# Patient Record
Sex: FEMALE | Race: WHITE | URBAN - METROPOLITAN AREA
[De-identification: names, ages, dates, MRNs, and addresses within clinical notes are randomized per-mention and may not be internally consistent; named-entity substitution may affect disease eponyms.]

---

## 2017-01-13 ENCOUNTER — OFFICE VISIT (OUTPATIENT)
Dept: FAMILY MEDICINE CLINIC | Age: 37
End: 2017-01-13

## 2017-01-13 VITALS
SYSTOLIC BLOOD PRESSURE: 102 MMHG | BODY MASS INDEX: 32.39 KG/M2 | DIASTOLIC BLOOD PRESSURE: 70 MMHG | WEIGHT: 176 LBS | HEART RATE: 78 BPM | TEMPERATURE: 98 F | RESPIRATION RATE: 16 BRPM | HEIGHT: 62 IN | OXYGEN SATURATION: 98 %

## 2017-01-13 DIAGNOSIS — J01.10 ACUTE NON-RECURRENT FRONTAL SINUSITIS: Primary | ICD-10-CM

## 2017-01-13 RX ORDER — HYDROCODONE POLISTIREX AND CHLORPHENIRAMINE POLISTIREX 10; 8 MG/5ML; MG/5ML
5 SUSPENSION, EXTENDED RELEASE ORAL
Qty: 120 ML | Refills: 0 | Status: SHIPPED | OUTPATIENT
Start: 2017-01-13 | End: 2017-01-23

## 2017-01-13 RX ORDER — AMOXICILLIN 875 MG/1
875 TABLET, FILM COATED ORAL 2 TIMES DAILY
Qty: 20 TAB | Refills: 0 | Status: SHIPPED | OUTPATIENT
Start: 2017-01-13 | End: 2017-01-23

## 2017-01-13 RX ORDER — BENZONATATE 200 MG/1
200 CAPSULE ORAL
Qty: 30 CAP | Refills: 0 | Status: SHIPPED | OUTPATIENT
Start: 2017-01-13 | End: 2017-01-20

## 2017-01-13 NOTE — MR AVS SNAPSHOT
Visit Information Date & Time Provider Department Dept. Phone Encounter #  
 1/13/2017  2:20 PM Nik Flores [phone] [id number] Follow-up Instructions Return if symptoms worsen or fail to improve. Upcoming Health Maintenance Date Due  
 PAP AKA CERVICAL CYTOLOGY 3/26/2018 DTaP/Tdap/Td series (2 - Td) 9/21/2024 Allergies as of 1/13/2017  Review Complete On: 1/13/2017 By: Anitra Em, DO No Known Allergies Current Immunizations  Never Reviewed Name Date Influenza Vaccine 10/29/2016 Tdap 9/21/2014 Not reviewed this visit You Were Diagnosed With   
  
 Codes Comments Acute non-recurrent frontal sinusitis    -  Primary ICD-10-CM: J01.10 ICD-9-CM: 403.4 Vitals BP Pulse Temp Resp Height(growth percentile) Weight(growth percentile) 102/70 (BP 1 Location: Right arm, BP Patient Position: Sitting) 78 98 °F (36.7 °C) (Oral) 16 5' 2\" (1.575 m) 176 lb (79.8 kg) SpO2 BMI OB Status Smoking Status 98% 32.19 kg/m2 Having regular periods Never Smoker Vitals History BMI and BSA Data Body Mass Index Body Surface Area  
 32.19 kg/m 2 1.87 m 2 Preferred Pharmacy Pharmacy Name Phone [facility], [address] [place] [address] [phone] Your Updated Medication List  
  
   
This list is accurate as of: 1/13/17  2:33 PM.  Always use your most recent med list.  
  
  
  
  
 albuterol 90 mcg/actuation inhaler Commonly known as:  PROVENTIL HFA, VENTOLIN HFA, PROAIR HFA Take 2 Puffs by inhalation every four (4) hours as needed for Wheezing or Shortness of Breath. amoxicillin 875 mg tablet Commonly known as:  AMOXIL Take 1 Tab by mouth two (2) times a day for 10 days. benzonatate 200 mg capsule Commonly known as:  TESSALON Take 1 Cap by mouth three (3) times daily as needed for Cough for up to 7 days. chlorpheniramine-HYDROcodone 10-8 mg/5 mL suspension Commonly known as:  Doneta Apple ER Take 5 mL by mouth every twelve (12) hours as needed for Cough for up to 10 days. Max Daily Amount: 10 mL.  
  
 escitalopram oxalate 10 mg tablet Commonly known as:  Valadez Bias TAKE ONE TABLET BY MOUTH ONE TIME DAILY  
  
 fexofenadine 180 mg tablet Commonly known as:  Kristin Record Take  by mouth. valACYclovir 500 mg tablet Commonly known as:  VALTREX Take 1 Tab by mouth two (2) times a day. Prescriptions Printed Refills  
 chlorpheniramine-HYDROcodone (TUSSIONEX PENNKINETIC ER) 10-8 mg/5 mL suspension 0 Sig: Take 5 mL by mouth every twelve (12) hours as needed for Cough for up to 10 days. Max Daily Amount: 10 mL. Class: Print Route: Oral  
  
Prescriptions Sent to Pharmacy Refills  
 amoxicillin (AMOXIL) 875 mg tablet 0 Sig: Take 1 Tab by mouth two (2) times a day for 10 days. Class: Normal  
 Pharmacy: [address] #: [phone] Route: Oral  
 benzonatate (TESSALON) 200 mg capsule 0 Sig: Take 1 Cap by mouth three (3) times daily as needed for Cough for up to 7 days. Class: Normal  
 Pharmacy: [address] #: [phone] Route: Oral  
  
Follow-up Instructions Return if symptoms worsen or fail to improve. Patient Instructions Sinusitis: Care Instructions Your Care Instructions Sinusitis is an infection of the lining of the sinus cavities in your head. Sinusitis often follows a cold. It causes pain and pressure in your head and face. In most cases, sinusitis gets better on its own in 1 to 2 weeks. But some mild symptoms may last for several weeks. Sometimes antibiotics are needed. Follow-up care is a key part of your treatment and safety.  Be sure to make and go to all appointments, and call your doctor if you are having problems. It's also a good idea to know your test results and keep a list of the medicines you take. How can you care for yourself at home? · Take an over-the-counter pain medicine, such as acetaminophen (Tylenol), ibuprofen (Advil, Motrin), or naproxen (Aleve). Read and follow all instructions on the label. · If the doctor prescribed antibiotics, take them as directed. Do not stop taking them just because you feel better. You need to take the full course of antibiotics. · Be careful when taking over-the-counter cold or flu medicines and Tylenol at the same time. Many of these medicines have acetaminophen, which is Tylenol. Read the labels to make sure that you are not taking more than the recommended dose. Too much acetaminophen (Tylenol) can be harmful. · Breathe warm, moist air from a steamy shower, a hot bath, or a sink filled with hot water. Avoid cold, dry air. Using a humidifier in your home may help. Follow the directions for cleaning the machine. · Use saline (saltwater) nasal washes to help keep your nasal passages open and wash out mucus and bacteria. You can buy saline nose drops at a grocery store or drugstore. Or you can make your own at home by adding 1 teaspoon of salt and 1 teaspoon of baking soda to 2 cups of distilled water. If you make your own, fill a bulb syringe with the solution, insert the tip into your nostril, and squeeze gently. [employer] Saucer your nose. · Put a hot, wet towel or a warm gel pack on your face 3 or 4 times a day for 5 to 10 minutes each time. · Try a decongestant nasal spray like oxymetazoline (Afrin). Do not use it for more than 3 days in a row. Using it for more than 3 days can make your congestion worse. When should you call for help? Call your doctor now or seek immediate medical care if: 
· You have new or worse swelling or redness in your face or around your eyes. · You have a new or higher fever.  
Watch closely for changes in your health, and be sure to contact your doctor if: 
· You have new or worse facial pain. · The mucus from your nose becomes thicker (like pus) or has new blood in it. · You are not getting better as expected. Where can you learn more? Go to http://lynn-palma.info/. Enter N706 in the search box to learn more about \"Sinusitis: Care Instructions. \" Current as of: July 29, 2016 Content Version: 11.1 © 5147-5296 Drive.SG. Care instructions adapted under license by [employer] (which disclaims liability or warranty for this information). If you have questions about a medical condition or this instruction, always ask your healthcare professional. [address] any warranty or liability for your use of this information. Introducing [facility]! Payton Khan introduces M-SIX patient portal. Now you can access parts of your medical record, email your doctor's office, and request medication refills online. 1. In your internet browser, go to [email or web address] 2. Click on the First Time User? Click Here link in the Sign In box. You will see the New Member Sign Up page. 3. Enter your M-SIX Access Code exactly as it appears below. You will not need to use this code after youve completed the sign-up process. If you do not sign up before the expiration date, you must request a new code. · M-SIX Access Code: [email or web address] Expires: 3/7/2017 10:03 AM 
 
4. Enter the last four digits of your Social Security Number (xxxx) and Date of Birth (mm/dd/yyyy) as indicated and click Submit. You will be taken to the next sign-up page. 5. Create a Movistat ID. This will be your M-SIX login ID and cannot be changed, so think of one that is secure and easy to remember. 6. Create a M-SIX password. You can change your password at any time. 7. Enter your Password Reset Question and Answer. This can be used at a later time if you forget your password. 8. Enter your e-mail address. You will receive e-mail notification when new information is available in [address] 9. Click Sign Up. You can now view and download portions of your medical record. 10. Click the Download Summary menu link to download a portable copy of your medical information. If you have questions, please visit the Frequently Asked Questions section of the PayPay website. Remember, PayPay is NOT to be used for urgent needs. For medical emergencies, dial 911. Now available from your iPhone and Android! Please provide this summary of care documentation to your next provider. Your primary care clinician is listed as Nilton Meeks. If you have any questions after today's visit, please call [phone].

## 2017-01-13 NOTE — PROGRESS NOTES
HISTORY OF PRESENT ILLNESS    Erin Jaquez is a 39y.o. year old female comes in today to be evaluated and treated for: cough, sore throat, congestion    Patients symptoms have been present for 1 weeks. Pain level 1/10 throat/face. Patient has tried:  OTC meds w/ some benefit. It is described as cough prod yellow and throat irritation w/ pain/pressure over right eye. No SOB or wheeze. Popping in ears but no pain. Current Outpatient Prescriptions   Medication Sig Dispense Refill    escitalopram oxalate (LEXAPRO) 10 mg tablet TAKE ONE TABLET BY MOUTH ONE TIME DAILY 90 Tab 0    fexofenadine (ALLEGRA) 180 mg tablet Take  by mouth.  albuterol (PROVENTIL HFA, VENTOLIN HFA, PROAIR HFA) 90 mcg/actuation inhaler Take 2 Puffs by inhalation every four (4) hours as needed for Wheezing or Shortness of Breath. 1 Inhaler 0    valACYclovir (VALTREX) 500 mg tablet Take 1 Tab by mouth two (2) times a day. 14 Tab 2     Past Medical History   Diagnosis Date    Depression     Seasonal allergic rhinitis     Thyroid disease        ROS:  No fever    Objective:  Visit Vitals    /70 (BP 1 Location: Right arm, BP Patient Position: Sitting)    Pulse 78    Temp 98 °F (36.7 °C) (Oral)    Resp 16    Ht 5' 2\" (1.575 m)    Wt 176 lb (79.8 kg)    SpO2 98%    BMI 32.19 kg/m2       GEN:  Appears stated age in NAD. HEENT: Conjunctiva/lids normal.  External canals/nares normal.  TM retracted bilaterally. Nose: mucosa erythematous and swollen and clear rhinorrhea Very tender palp right frontal sinus  Tongue midline. Throat cobblestoning present. Exudates not present. tonsils are present and normal.  NECK: Trachea midline. No masses. no regional adenopathy  CARDIAC:  regular rate and rhythm. no Murmur, no peripheral edema. LUNGS: lungs clear to auscultation, no accessory muscle use. MS: no clubbing/cyanosis. SKIN: Warm/dry without rash. Assessment/Plan:     ICD-10-CM ICD-9-CM    1.  Acute non-recurrent frontal sinusitis J01.10 461.1 amoxicillin (AMOXIL) 875 mg tablet      chlorpheniramine-HYDROcodone (TUSSIONEX PENNKINETIC ER) 10-8 mg/5 mL suspension      benzonatate (TESSALON) 200 mg capsule       Patient verbalized understanding of plan. Will Tx with above and RTC if not improved.

## 2017-01-13 NOTE — PROGRESS NOTES
Patient is currently not taking the following medications and wants them removed from their list:    no    Learning Assessment (baseline): complete  Depression Screening: complete      1. Have you been to the ER, urgent care clinic since your last visit? Hospitalized since your last visit? No    2. Have you seen or consulted any other health care providers outside of the [address] [place] [address] since your last visit? Include any pap smears or colon screening.  No      Patient is due for the following immunizations:    Influenza: completed

## 2017-01-13 NOTE — PATIENT INSTRUCTIONS
Sinusitis: Care Instructions  Your Care Instructions    Sinusitis is an infection of the lining of the sinus cavities in your head. Sinusitis often follows a cold. It causes pain and pressure in your head and face. In most cases, sinusitis gets better on its own in 1 to 2 weeks. But some mild symptoms may last for several weeks. Sometimes antibiotics are needed. Follow-up care is a key part of your treatment and safety. Be sure to make and go to all appointments, and call your doctor if you are having problems. It's also a good idea to know your test results and keep a list of the medicines you take. How can you care for yourself at home? · Take an over-the-counter pain medicine, such as acetaminophen (Tylenol), ibuprofen (Advil, Motrin), or naproxen (Aleve). Read and follow all instructions on the label. · If the doctor prescribed antibiotics, take them as directed. Do not stop taking them just because you feel better. You need to take the full course of antibiotics. · Be careful when taking over-the-counter cold or flu medicines and Tylenol at the same time. Many of these medicines have acetaminophen, which is Tylenol. Read the labels to make sure that you are not taking more than the recommended dose. Too much acetaminophen (Tylenol) can be harmful. · Breathe warm, moist air from a steamy shower, a hot bath, or a sink filled with hot water. Avoid cold, dry air. Using a humidifier in your home may help. Follow the directions for cleaning the machine. · Use saline (saltwater) nasal washes to help keep your nasal passages open and wash out mucus and bacteria. You can buy saline nose drops at a grocery store or drugstore. Or you can make your own at home by adding 1 teaspoon of salt and 1 teaspoon of baking soda to 2 cups of distilled water. If you make your own, fill a bulb syringe with the solution, insert the tip into your nostril, and squeeze gently. Fonnie Snowflake your nose.   · Put a hot, wet towel or a warm gel pack on your face 3 or 4 times a day for 5 to 10 minutes each time. · Try a decongestant nasal spray like oxymetazoline (Afrin). Do not use it for more than 3 days in a row. Using it for more than 3 days can make your congestion worse. When should you call for help? Call your doctor now or seek immediate medical care if:  · You have new or worse swelling or redness in your face or around your eyes. · You have a new or higher fever. Watch closely for changes in your health, and be sure to contact your doctor if:  · You have new or worse facial pain. · The mucus from your nose becomes thicker (like pus) or has new blood in it. · You are not getting better as expected. Where can you learn more? Go to http://lynn-palma.info/. Enter R366 in the search box to learn more about \"Sinusitis: Care Instructions. \"  Current as of: July 29, 2016  Content Version: 11.1  © 20065924-4657 [employer]. Care instructions adapted under license by [employer] (which disclaims liability or warranty for this information). If you have questions about a medical condition or this instruction, always ask your healthcare professional. [place] [address] any warranty or liability for your use of this information.

## 2017-01-25 ENCOUNTER — TELEPHONE (OUTPATIENT)
Dept: FAMILY MEDICINE CLINIC | Age: 37
End: 2017-01-25

## 2017-01-25 DIAGNOSIS — J40 BRONCHITIS: Primary | ICD-10-CM

## 2017-01-25 RX ORDER — AZITHROMYCIN 250 MG/1
250 TABLET, FILM COATED ORAL SEE ADMIN INSTRUCTIONS
Qty: 6 TAB | Refills: 0 | Status: SHIPPED | OUTPATIENT
Start: 2017-01-25 | End: 2017-04-24 | Stop reason: ALTCHOICE

## 2017-01-25 NOTE — TELEPHONE ENCOUNTER
Pt calling to inform provider that cough is getting worse and would like to know if she needs to come back in or can something else be called into pharmacy.

## 2017-01-25 NOTE — TELEPHONE ENCOUNTER
Called left patient a message on secure line that a zpack was sent in which should help but if it does not resolve then she needs to be seen.

## 2017-04-24 DIAGNOSIS — F43.22 ADJUSTMENT DISORDER WITH ANXIOUS MOOD: ICD-10-CM

## 2017-04-24 RX ORDER — ESCITALOPRAM OXALATE 10 MG/1
TABLET ORAL
Qty: 90 TAB | Refills: 0 | Status: SHIPPED | OUTPATIENT
Start: 2017-04-24 | End: 2017-08-18 | Stop reason: SDUPTHER

## 2017-08-10 ENCOUNTER — OFFICE VISIT (OUTPATIENT)
Dept: FAMILY MEDICINE CLINIC | Age: 37
End: 2017-08-10

## 2017-08-10 VITALS
RESPIRATION RATE: 18 BRPM | OXYGEN SATURATION: 98 % | DIASTOLIC BLOOD PRESSURE: 76 MMHG | SYSTOLIC BLOOD PRESSURE: 120 MMHG | BODY MASS INDEX: 32.02 KG/M2 | TEMPERATURE: 98.4 F | HEART RATE: 76 BPM | WEIGHT: 174 LBS | HEIGHT: 62 IN

## 2017-08-10 DIAGNOSIS — T36.95XA ANTIBIOTIC-INDUCED YEAST INFECTION: ICD-10-CM

## 2017-08-10 DIAGNOSIS — J01.01 ACUTE RECURRENT MAXILLARY SINUSITIS: Primary | ICD-10-CM

## 2017-08-10 DIAGNOSIS — B37.9 ANTIBIOTIC-INDUCED YEAST INFECTION: ICD-10-CM

## 2017-08-10 RX ORDER — CEFUROXIME AXETIL 500 MG/1
500 TABLET ORAL 2 TIMES DAILY
Qty: 20 TAB | Refills: 0 | Status: SHIPPED | OUTPATIENT
Start: 2017-08-10 | End: 2017-09-19 | Stop reason: ALTCHOICE

## 2017-08-10 RX ORDER — FLUCONAZOLE 150 MG/1
150 TABLET ORAL DAILY
Qty: 1 TAB | Refills: 1 | Status: SHIPPED | OUTPATIENT
Start: 2017-08-10 | End: 2017-08-11

## 2017-08-10 NOTE — PROGRESS NOTES
HISTORY OF PRESENT ILLNESS  Leonarda Moore is a 44 y.o. female.   HPI    ROS    Physical Exam    ASSESSMENT and PLAN  {ASSESSMENT/PLAN:70397}

## 2017-08-10 NOTE — MR AVS SNAPSHOT
Visit Information Date & Time Provider Department Dept. Phone Encounter #  
 8/10/2017  2:15 PM Babs Abrams NP [facility] [id number] [phone] Upcoming Health Maintenance Date Due INFLUENZA AGE 9 TO ADULT 8/1/2017 PAP AKA CERVICAL CYTOLOGY 3/26/2018 DTaP/Tdap/Td series (2 - Td) 9/21/2024 Allergies as of 8/10/2017  Review Complete On: 8/10/2017 By: Babs Abrams NP No Known Allergies Current Immunizations  Never Reviewed Name Date Influenza Vaccine 10/29/2016 Tdap 9/21/2014 Not reviewed this visit You Were Diagnosed With   
  
 Codes Comments Acute recurrent maxillary sinusitis    -  Primary ICD-10-CM: J01.01 
ICD-9-CM: 461.0 Antibiotic-induced yeast infection     ICD-10-CM: B37.9, T36.95XA ICD-9-CM: 112.9 Vitals BP Pulse Temp Resp Height(growth percentile) Weight(growth percentile) 120/76 (BP 1 Location: Left arm, BP Patient Position: Sitting) 76 98.4 °F (36.9 °C) (Oral) 18 5' 2\" (1.575 m) 174 lb (78.9 kg) LMP SpO2 BMI OB Status Smoking Status 08/02/2017 98% 31.83 kg/m2 Having regular periods Never Smoker BMI and BSA Data Body Mass Index Body Surface Area  
 31.83 kg/m 2 1.86 m 2 Preferred Pharmacy Pharmacy Name Phone [facility], [address] [place] [address] [phone] Your Updated Medication List  
  
   
This list is accurate as of: 8/10/17  3:08 PM.  Always use your most recent med list.  
  
  
  
  
 albuterol 90 mcg/actuation inhaler Commonly known as:  PROVENTIL HFA, VENTOLIN HFA, PROAIR HFA Take 2 Puffs by inhalation every four (4) hours as needed for Wheezing or Shortness of Breath. cefUROXime 500 mg tablet Commonly known as:  CEFTIN Take 1 Tab by mouth two (2) times a day. escitalopram oxalate 10 mg tablet Commonly known as:  Vallorie Primmer TAKE ONE TABLET BY MOUTH ONE TIME DAILY  
  
 fexofenadine 180 mg tablet Commonly known as:  Antonia Hardy Take  by mouth. fluconazole 150 mg tablet Commonly known as:  DIFLUCAN Take 1 Tab by mouth daily for 1 day. FDA advises cautious prescribing of oral fluconazole in pregnancy. valACYclovir 500 mg tablet Commonly known as:  VALTREX Take 1 Tab by mouth two (2) times a day. Prescriptions Sent to Pharmacy Refills  
 cefUROXime (CEFTIN) 500 mg tablet 0 Sig: Take 1 Tab by mouth two (2) times a day. Class: Normal  
 Pharmacy: [address] #: [phone] Route: Oral  
 fluconazole (DIFLUCAN) 150 mg tablet 1 Sig: Take 1 Tab by mouth daily for 1 day. FDA advises cautious prescribing of oral fluconazole in pregnancy. Class: Normal  
 Pharmacy: [address] #: [phone] Route: Oral  
  
Introducing [facility]! Anya Rodriguez introduces ChinaCache patient portal. Now you can access parts of your medical record, email your doctor's office, and request medication refills online. 1. In your internet browser, go to [email or web address] 2. Click on the First Time User? Click Here link in the Sign In box. You will see the New Member Sign Up page. 3. Enter your ChinaCache Access Code exactly as it appears below. You will not need to use this code after youve completed the sign-up process. If you do not sign up before the expiration date, you must request a new code. · ChinaCache Access Code: [email or web address] Expires: 11/8/2017  3:07 PM 
 
4. Enter the last four digits of your Social Security Number (xxxx) and Date of Birth (mm/dd/yyyy) as indicated and click Submit. You will be taken to the next sign-up page. 5. Create a LiBt ID. This will be your ChinaCache login ID and cannot be changed, so think of one that is secure and easy to remember. 6. Create a LiBt password. You can change your password at any time. 7. Enter your Password Reset Question and Answer. This can be used at a later time if you forget your password. 8. Enter your e-mail address. You will receive e-mail notification when new information is available in [address] 9. Click Sign Up. You can now view and download portions of your medical record. 10. Click the Download Summary menu link to download a portable copy of your medical information. If you have questions, please visit the Frequently Asked Questions section of the Captora website. Remember, Captora is NOT to be used for urgent needs. For medical emergencies, dial 911. Now available from your iPhone and Android! Please provide this summary of care documentation to your next provider. Your primary care clinician is listed as Bernette Fothergill. If you have any questions after today's visit, please call [phone].

## 2017-08-10 NOTE — PROGRESS NOTES
HISTORY OF PRESENT ILLNESS  Miranda Camacho is a 44 y.o. female. Pt presents today for a week of worsening facial congestion. She states she has lots of facial pressure. She blows out green stuff and she coughs up yellow. She denies fever or shortness of breath. She does state she had an episode of jaw pain. HPI    Review of Systems   Constitutional: Negative. HENT: Positive for congestion. Respiratory: Positive for cough and sputum production. Negative for shortness of breath and wheezing. Skin: Negative. Visit Vitals    /76 (BP 1 Location: Left arm, BP Patient Position: Sitting)    Pulse 76    Temp 98.4 °F (36.9 °C) (Oral)    Resp 18    Ht 5' 2\" (1.575 m)    Wt 174 lb (78.9 kg)    LMP 08/02/2017    SpO2 98%    BMI 31.83 kg/m2       Physical Exam   Constitutional: She appears well-developed. No distress. HENT:   Head: Normocephalic and atraumatic. Right Ear: Tympanic membrane is retracted. Left Ear: Tympanic membrane is retracted. Eyes: Conjunctivae and EOM are normal. Pupils are equal, round, and reactive to light. Right eye exhibits no discharge. Left eye exhibits no discharge. Neck: Normal range of motion. Neck supple. Cardiovascular: Normal rate, regular rhythm and normal heart sounds. No murmur heard. Pulmonary/Chest: Effort normal and breath sounds normal. No respiratory distress. She has no wheezes. She has no rales. ASSESSMENT and PLAN    ICD-10-CM ICD-9-CM    1. Acute recurrent maxillary sinusitis J01.01 461.0 cefUROXime (CEFTIN) 500 mg tablet   2. Antibiotic-induced yeast infection B37.9 112.9 fluconazole (DIFLUCAN) 150 mg tablet    T36.95XA         PLAN:  Pt instructed on how to take antibiotic. She will use the Diflucan if a yeast infection is triggered. Pt is to call if symptoms persist or worsen. Pt was given after visit summary.

## 2017-08-18 DIAGNOSIS — F43.22 ADJUSTMENT DISORDER WITH ANXIOUS MOOD: ICD-10-CM

## 2017-08-18 RX ORDER — ESCITALOPRAM OXALATE 10 MG/1
TABLET ORAL
Qty: 90 TAB | Refills: 0 | Status: SHIPPED | OUTPATIENT
Start: 2017-08-18 | End: 2017-11-16 | Stop reason: SDUPTHER

## 2017-09-19 ENCOUNTER — OFFICE VISIT (OUTPATIENT)
Dept: FAMILY MEDICINE CLINIC | Age: 37
End: 2017-09-19

## 2017-09-19 VITALS
OXYGEN SATURATION: 98 % | BODY MASS INDEX: 31.87 KG/M2 | HEIGHT: 62 IN | HEART RATE: 103 BPM | TEMPERATURE: 97.9 F | DIASTOLIC BLOOD PRESSURE: 60 MMHG | SYSTOLIC BLOOD PRESSURE: 118 MMHG | RESPIRATION RATE: 16 BRPM | WEIGHT: 173.2 LBS

## 2017-09-19 DIAGNOSIS — J01.01 ACUTE RECURRENT MAXILLARY SINUSITIS: ICD-10-CM

## 2017-09-19 DIAGNOSIS — B37.9 ANTIBIOTIC-INDUCED YEAST INFECTION: Primary | ICD-10-CM

## 2017-09-19 DIAGNOSIS — T36.95XA ANTIBIOTIC-INDUCED YEAST INFECTION: Primary | ICD-10-CM

## 2017-09-19 RX ORDER — IBUPROFEN 800 MG/1
TABLET ORAL
Refills: 3 | COMMUNITY
Start: 2017-09-13 | End: 2018-10-10 | Stop reason: ALTCHOICE

## 2017-09-19 RX ORDER — CEFUROXIME AXETIL 500 MG/1
500 TABLET ORAL 2 TIMES DAILY
Qty: 20 TAB | Refills: 0 | Status: SHIPPED | OUTPATIENT
Start: 2017-09-19 | End: 2017-10-02 | Stop reason: ALTCHOICE

## 2017-09-19 RX ORDER — FLUCONAZOLE 150 MG/1
TABLET ORAL
Qty: 2 TAB | Refills: 0 | Status: SHIPPED | OUTPATIENT
Start: 2017-09-19 | End: 2017-10-02 | Stop reason: ALTCHOICE

## 2017-09-19 NOTE — PROGRESS NOTES
HISTORY OF PRESENT ILLNESS  Audrey Joe is a 44 y.o. female. Patient presents today with sinus congestion. HPI  It started over three days ago with a very sore throat and now she has facial pressure. She started her allergy medicines. She had the ablation done and so far it is working. Review of Systems   Constitutional: Negative. HENT: Positive for congestion, ear pain, sinus pain and sore throat. Respiratory: Positive for cough. Negative for sputum production and shortness of breath. Cardiovascular: Negative. Visit Vitals    /60 (BP 1 Location: Left arm, BP Patient Position: Sitting)    Pulse (!) 103    Temp 97.9 °F (36.6 °C) (Oral)    Resp 16    Ht 5' 2\" (1.575 m)    Wt 173 lb 3.2 oz (78.6 kg)    LMP 09/08/2017 (Approximate)    SpO2 98%    BMI 31.68 kg/m2       Physical Exam   Constitutional: She appears well-developed and well-nourished. No distress. HENT:   Head: Normocephalic and atraumatic. Right Ear: A middle ear effusion (right worse than left) is present. Left Ear: A middle ear effusion is present. Nose: Mucosal edema and rhinorrhea present. Mouth/Throat: Oropharyngeal exudate and posterior oropharyngeal erythema present. Neck: Normal range of motion. Neck supple. Cardiovascular: Normal rate, regular rhythm and normal heart sounds. No murmur heard. Pulmonary/Chest: Effort normal and breath sounds normal. No respiratory distress. She has no wheezes. She has no rales. Lymphadenopathy:     She has cervical adenopathy. ASSESSMENT and PLAN    ICD-10-CM ICD-9-CM    1. Antibiotic-induced yeast infection B37.9 112.9 fluconazole (DIFLUCAN) 150 mg tablet    T36.95XA     2. Acute recurrent maxillary sinusitis J01.01 461.0 cefUROXime (CEFTIN) 500 mg tablet     Plan;  Pt advised to continue her allergy medicine and she is to call if symptoms persist or worsen. Warm salt water gargles for the throat symptoms. Pt given after visit summary.

## 2017-09-19 NOTE — MR AVS SNAPSHOT
Visit Information Date & Time Provider Department Dept. Phone Encounter #  
 9/19/2017 12:15 PM Emeka Boudreaux NP [place] [phone] Upcoming Health Maintenance Date Due INFLUENZA AGE 9 TO ADULT 10/19/2017* PAP AKA CERVICAL CYTOLOGY 3/26/2018 DTaP/Tdap/Td series (2 - Td) 9/21/2024 *Topic was postponed. The date shown is not the original due date. Allergies as of 9/19/2017  Review Complete On: 9/19/2017 By: Emeka Boudreaux NP No Known Allergies Current Immunizations  Never Reviewed Name Date Influenza Vaccine 10/29/2016 Tdap 9/21/2014 Not reviewed this visit You Were Diagnosed With   
  
 Codes Comments Antibiotic-induced yeast infection    -  Primary ICD-10-CM: B37.9, T36.95XA ICD-9-CM: 112.9 Acute recurrent maxillary sinusitis     ICD-10-CM: J01.01 
ICD-9-CM: 461.0 Vitals BP Pulse Temp Resp Height(growth percentile) Weight(growth percentile)  
 118/60 (BP 1 Location: Left arm, BP Patient Position: Sitting) (!) 103 97.9 °F (36.6 °C) (Oral) 16 5' 2\" (1.575 m) 173 lb 3.2 oz (78.6 kg) LMP SpO2 BMI OB Status Smoking Status 09/08/2017 (Approximate) 98% 31.68 kg/m2 Having regular periods Never Smoker BMI and BSA Data Body Mass Index Body Surface Area  
 31.68 kg/m 2 1.85 m 2 Preferred Pharmacy Pharmacy Name Phone [address] [phone] Your Updated Medication List  
  
   
This list is accurate as of: 9/19/17 12:35 PM.  Always use your most recent med list.  
  
  
  
  
 albuterol 90 mcg/actuation inhaler Commonly known as:  PROVENTIL HFA, VENTOLIN HFA, PROAIR HFA Take 2 Puffs by inhalation every four (4) hours as needed for Wheezing or Shortness of Breath. cefUROXime 500 mg tablet Commonly known as:  CEFTIN Take 1 Tab by mouth two (2) times a day. escitalopram oxalate 10 mg tablet Commonly known as:  Anil Gabriella TAKE ONE TABLET BY MOUTH ONE TIME DAILY  
  
 fexofenadine 180 mg tablet Commonly known as:  Stephan Thacker Take  by mouth. fluconazole 150 mg tablet Commonly known as:  DIFLUCAN  
FDA advises cautious prescribing of oral fluconazole in pregnancy. Take one now and may repeat in 7-10days. ibuprofen 800 mg tablet Commonly known as:  MOTRIN  
TAKE 1 TABLET BY MOUTH 3 TIMES A DAY AS NEEDED FOR PAIN  
  
 valACYclovir 500 mg tablet Commonly known as:  VALTREX Take 1 Tab by mouth two (2) times a day. Prescriptions Sent to Pharmacy Refills  
 cefUROXime (CEFTIN) 500 mg tablet 0 Sig: Take 1 Tab by mouth two (2) times a day. Class: Normal  
 Pharmacy: [address] #: [phone] Route: Oral  
 fluconazole (DIFLUCAN) 150 mg tablet 0 Sig: FDA advises cautious prescribing of oral fluconazole in pregnancy. Take one now and may repeat in 7-10days. Class: Normal  
 Pharmacy: [address] #: [phone] Introducing [facility] & HEALTH SERVICES! Nyla Suh introduces [employer] patient portal. Now you can access parts of your medical record, email your doctor's office, and request medication refills online. 1. In your internet browser, go to [email or web address] 2. Click on the First Time User? Click Here link in the Sign In box. You will see the New Member Sign Up page. 3. Enter your [employer] Access Code exactly as it appears below. You will not need to use this code after youve completed the sign-up process. If you do not sign up before the expiration date, you must request a new code. · [employer] Access Code: [email or web address] Expires: 11/8/2017  3:07 PM 
 
4. Enter the last four digits of your Social Security Number (xxxx) and Date of Birth (mm/dd/yyyy) as indicated and click Submit. You will be taken to the next sign-up page. 5. Create a [employer] ID. This will be your [employer] login ID and cannot be changed, so think of one that is secure and easy to remember. 6. Create a [employer] password. You can change your password at any time. 7. Enter your Password Reset Question and Answer. This can be used at a later time if you forget your password. 8. Enter your e-mail address. You will receive e-mail notification when new information is available in [address] 9. Click Sign Up. You can now view and download portions of your medical record. 10. Click the Download Summary menu link to download a portable copy of your medical information. If you have questions, please visit the Frequently Asked Questions section of the [employer] website. Remember, [employer] is NOT to be used for urgent needs. For medical emergencies, dial 911. Now available from your iPhone and Android! Please provide this summary of care documentation to your next provider. Your primary care clinician is listed as Mirian Bernal. If you have any questions after today's visit, please call [phone].

## 2017-09-19 NOTE — PROGRESS NOTES
1. Have you been to the ER, urgent care clinic since your last visit? Hospitalized since your last visit? No    2. Have you seen or consulted any other health care providers outside of the [address] [place] [address] since your last visit? Include any pap smears or colon screening.  No

## 2017-09-25 ENCOUNTER — TELEPHONE (OUTPATIENT)
Dept: FAMILY MEDICINE CLINIC | Age: 37
End: 2017-09-25

## 2017-09-25 DIAGNOSIS — J40 BRONCHITIS: Primary | ICD-10-CM

## 2017-09-25 RX ORDER — AZITHROMYCIN 250 MG/1
TABLET, FILM COATED ORAL
Qty: 6 TAB | Refills: 0 | Status: SHIPPED | OUTPATIENT
Start: 2017-09-25 | End: 2017-09-30

## 2017-09-25 RX ORDER — PREDNISONE 20 MG/1
TABLET ORAL
Qty: 10 TAB | Refills: 0 | Status: SHIPPED | OUTPATIENT
Start: 2017-09-25 | End: 2017-10-02 | Stop reason: ALTCHOICE

## 2017-09-25 NOTE — TELEPHONE ENCOUNTER
Spoke with patient informing her that prednisone and zpack was sent to pharmacy. Informed patient to complete ceftin and start zpack and prednisone. Patient verbally understood.

## 2017-09-25 NOTE — TELEPHONE ENCOUNTER
Patient states since the visit she has been on antibiotics 7 days and is getting worse. The sinus infection has moved to her chest and she is coughing up brown yellow mucus. Patient would like to know if there is something else that can be prescribed to get rid of this . I informed her that I will send a message to Magdalena Diaz and get back with her. Patient verbally understood.

## 2017-10-02 ENCOUNTER — OFFICE VISIT (OUTPATIENT)
Dept: FAMILY MEDICINE CLINIC | Age: 37
End: 2017-10-02

## 2017-10-02 VITALS
RESPIRATION RATE: 12 BRPM | OXYGEN SATURATION: 98 % | WEIGHT: 176.6 LBS | HEART RATE: 75 BPM | DIASTOLIC BLOOD PRESSURE: 82 MMHG | TEMPERATURE: 98.2 F | SYSTOLIC BLOOD PRESSURE: 100 MMHG | HEIGHT: 62 IN | BODY MASS INDEX: 32.5 KG/M2

## 2017-10-02 DIAGNOSIS — J40 BRONCHITIS: Primary | ICD-10-CM

## 2017-10-02 DIAGNOSIS — J01.10 ACUTE FRONTAL SINUSITIS, RECURRENCE NOT SPECIFIED: ICD-10-CM

## 2017-10-02 RX ORDER — METHYLPREDNISOLONE ACETATE 80 MG/ML
80 INJECTION, SUSPENSION INTRA-ARTICULAR; INTRALESIONAL; INTRAMUSCULAR; SOFT TISSUE ONCE
Qty: 1 VIAL | Refills: 0
Start: 2017-10-02 | End: 2017-10-02

## 2017-10-02 RX ORDER — PREDNISONE 10 MG/1
TABLET ORAL
Qty: 33 TAB | Refills: 0 | Status: SHIPPED | OUTPATIENT
Start: 2017-10-02 | End: 2017-12-04 | Stop reason: ALTCHOICE

## 2017-10-02 RX ORDER — AMOXICILLIN AND CLAVULANATE POTASSIUM 875; 125 MG/1; MG/1
1 TABLET, FILM COATED ORAL 2 TIMES DAILY
Qty: 20 TAB | Refills: 0 | Status: SHIPPED | OUTPATIENT
Start: 2017-10-02 | End: 2017-10-12

## 2017-10-02 RX ORDER — CEFTRIAXONE 1 G/1
1 INJECTION, POWDER, FOR SOLUTION INTRAMUSCULAR; INTRAVENOUS ONCE
Qty: 1 VIAL | Refills: 0
Start: 2017-10-02 | End: 2017-10-02

## 2017-10-02 NOTE — PROGRESS NOTES
1. Have you been to the ER, urgent care clinic since your last visit? Hospitalized since your last visit? No    2. Have you seen or consulted any other health care providers outside of the [address] [place] [address] since your last visit? Include any pap smears or colon screening.  No  .

## 2017-10-02 NOTE — PROGRESS NOTES
HISTORY OF PRESENT ILLNESS  Shira Muro is a 44 y.o. female. Patient returns to clinic with continue cough and not feeling well. HPI  Pt has completed two courses of Ceftin and a Zpack as well as 5 days of prednisone. Pt is going to [address] this Thursday. Review of Systems   Constitutional: Negative. HENT: Positive for congestion, ear pain and sinus pain. Respiratory: Positive for cough, sputum production, shortness of breath and wheezing. Cardiovascular: Negative. Skin: Negative. Visit Vitals    /82 (BP 1 Location: Left arm, BP Patient Position: Sitting)    Pulse 75    Temp 98.2 °F (36.8 °C) (Oral)    Resp 12    Ht 5' 2\" (1.575 m)    Wt 176 lb 9.6 oz (80.1 kg)    LMP 09/08/2017 (Approximate)    SpO2 98%    BMI 32.3 kg/m2       Physical Exam   Constitutional: She appears well-developed and well-nourished. No distress. HENT:   Head: Normocephalic and atraumatic. Right Ear: External ear normal. A middle ear effusion is present. Left Ear: Tympanic membrane, external ear and ear canal normal.   Nose: Mucosal edema present. Mouth/Throat: Oropharyngeal exudate and posterior oropharyngeal erythema present. Eyes: Pupils are equal, round, and reactive to light. Right eye exhibits no discharge. Neck: Normal range of motion. Neck supple. Cardiovascular: Normal rate and normal heart sounds. Pulmonary/Chest: No accessory muscle usage. No respiratory distress. She has decreased breath sounds in the right middle field and the right lower field. She has wheezes in the right lower field. ASSESSMENT and PLAN    ICD-10-CM ICD-9-CM    1. Bronchitis J40 490 METHYLPREDNISOLONE ACETATE INJECTION 80 MG      MI THER/PROPH/DIAG INJECTION, SUBCUT/IM      CEFTRIAXONE SODIUM INJECTION  MG      MI THER/PROPH/DIAG INJECTION, SUBCUT/IM      predniSONE (DELTASONE) 10 mg tablet   2.  Acute frontal sinusitis, recurrence not specified J01.10 461.1 METHYLPREDNISOLONE ACETATE INJECTION 80 MG      OH THER/PROPH/DIAG INJECTION, SUBCUT/IM      OH THER/PROPH/DIAG INJECTION, SUBCUT/IM      amoxicillin-clavulanate (AUGMENTIN) 875-125 mg per tablet     PLAN:  Pt instructed not to take any antibiotic or prednisone today. She is to start her orals tomorrow. Day 1: 6 tablets  Day 2: 5 tablets  Day 3: 4 tablets  Days 4,5,6: 3 tablets  Days 7,8,9: 2 tablets  Days 10,11,12: 1 tablet. Pt advised to take the prednisone in the morning with food. Pt instructed to take the antibiotic Augmentin twice a day for 10days. She is to call with any concerns. Pt given after visit summary.

## 2017-10-02 NOTE — MR AVS SNAPSHOT
Visit Information Date & Time Provider Department Dept. Phone Encounter #  
 10/2/2017  9:00 AM Pilo Andrews [place] [facility] [id number] Upcoming Health Maintenance Date Due INFLUENZA AGE 9 TO ADULT 10/19/2017* PAP AKA CERVICAL CYTOLOGY 3/26/2018 DTaP/Tdap/Td series (2 - Td) 9/21/2024 *Topic was postponed. The date shown is not the original due date. Allergies as of 10/2/2017  Review Complete On: 10/2/2017 By: Pilo Andrews NP No Known Allergies Current Immunizations  Never Reviewed Name Date Influenza Vaccine 10/29/2016 Tdap 9/21/2014 Not reviewed this visit You Were Diagnosed With   
  
 Codes Comments Bronchitis    -  Primary ICD-10-CM: P47 ICD-9-CM: 266 Acute frontal sinusitis, recurrence not specified     ICD-10-CM: J01.10 ICD-9-CM: 485.8 Vitals BP Pulse Temp Resp Height(growth percentile) Weight(growth percentile) 100/82 (BP 1 Location: Left arm, BP Patient Position: Sitting) 75 98.2 °F (36.8 °C) (Oral) 12 5' 2\" (1.575 m) 176 lb 9.6 oz (80.1 kg) LMP SpO2 BMI OB Status Smoking Status 09/08/2017 (Approximate) 98% 32.3 kg/m2 Having regular periods Never Smoker BMI and BSA Data Body Mass Index Body Surface Area  
 32.3 kg/m 2 1.87 m 2 Preferred Pharmacy Pharmacy Name Phone [address] [phone] Your Updated Medication List  
  
   
This list is accurate as of: 10/2/17  9:58 AM.  Always use your most recent med list.  
  
  
  
  
 albuterol 90 mcg/actuation inhaler Commonly known as:  PROVENTIL HFA, VENTOLIN HFA, PROAIR HFA Take 2 Puffs by inhalation every four (4) hours as needed for Wheezing or Shortness of Breath. amoxicillin-clavulanate 875-125 mg per tablet Commonly known as:  AUGMENTIN Take 1 Tab by mouth two (2) times a day for 10 days. cefTRIAXone 1 gram injection Commonly known as:  ROCEPHIN  
1 g by IntraMUSCular route once for 1 dose. escitalopram oxalate 10 mg tablet Commonly known as:  Sravan Mcbride TAKE ONE TABLET BY MOUTH ONE TIME DAILY  
  
 fexofenadine 180 mg tablet Commonly known as:  Carletha Nipper Take  by mouth. ibuprofen 800 mg tablet Commonly known as:  MOTRIN  
TAKE 1 TABLET BY MOUTH 3 TIMES A DAY AS NEEDED FOR PAIN  
  
 methylPREDNISolone acetate 80 mg/mL injection Commonly known as:  DEPO-MEDROL 1 mL by IntraMUSCular route once for 1 dose. predniSONE 10 mg tablet Commonly known as:  Margarito Alicea Day 1: 6 tablets Day 2: 5 tablets Day 3: 4 tablets Days 4,5,6: 3 tablets Days 7,8,9: 2 tablets Days 10,11,12: 1 tablet. valACYclovir 500 mg tablet Commonly known as:  VALTREX Take 1 Tab by mouth two (2) times a day. Prescriptions Sent to Pharmacy Refills  
 predniSONE (DELTASONE) 10 mg tablet 0 Sig: Day 1: 6 tablets Day 2: 5 tablets Day 3: 4 tablets Days 4,5,6: 3 tablets Days 7,8,9: 2 tablets Days 10,11,12: 1 tablet. Class: Normal  
 Pharmacy: [place] [address] [place] [address] Ph #: [phone]  
 amoxicillin-clavulanate (AUGMENTIN) 875-125 mg per tablet 0 Sig: Take 1 Tab by mouth two (2) times a day for 10 days. Class: Normal  
 Pharmacy: [address] Ph #: [phone] Route: Oral  
  
We Performed the Following CEFTRIAXONE SODIUM INJECTION  MG [ HCP] Comments:  
 Mix per protocol METHYLPREDNISOLONE ACETATE INJECTION 80 MG [ HCPCS] RI THER/PROPH/DIAG INJECTION, SUBCUT/IM [id number] CPT(R)] RI THER/PROPH/DIAG INJECTION, SUBCUT/IM [id number] CPT(R)] Introducing [facility] & HEALTH SERVICES! [facility] introduces [employer] patient portal. Now you can access parts of your medical record, email your doctor's office, and request medication refills online.    
 
1. In your internet browser, go to [email or web address] 2. Click on the First Time User? Click Here link in the Sign In box. You will see the New Member Sign Up page. 3. Enter your Seelio Access Code exactly as it appears below. You will not need to use this code after youve completed the sign-up process. If you do not sign up before the expiration date, you must request a new code. · Seelio Access Code: [email or web address] Expires: 11/8/2017  3:07 PM 
 
4. Enter the last four digits of your Social Security Number (xxxx) and Date of Birth (mm/dd/yyyy) as indicated and click Submit. You will be taken to the next sign-up page. 5. Create a [employer] ID. This will be your Seelio login ID and cannot be changed, so think of one that is secure and easy to remember. 6. Create a Seelio password. You can change your password at any time. 7. Enter your Password Reset Question and Answer. This can be used at a later time if you forget your password. 8. Enter your e-mail address. You will receive e-mail notification when new information is available in [address] 9. Click Sign Up. You can now view and download portions of your medical record. 10. Click the Download Summary menu link to download a portable copy of your medical information. If you have questions, please visit the Frequently Asked Questions section of the Se[employer] website. Remember, Se[employer] is NOT to be used for urgent needs. For medical emergencies, dial 911. Now available from your iPhone and Android! Please provide this summary of care documentation to your next provider. Your primary care clinician is listed as Jolene Gonzalez. If you have any questions after today's visit, please call [phone].

## 2017-11-16 DIAGNOSIS — F43.22 ADJUSTMENT DISORDER WITH ANXIOUS MOOD: ICD-10-CM

## 2017-11-16 RX ORDER — ESCITALOPRAM OXALATE 10 MG/1
TABLET ORAL
Qty: 90 TAB | Refills: 0 | OUTPATIENT
Start: 2017-11-16

## 2017-11-16 RX ORDER — ESCITALOPRAM OXALATE 10 MG/1
TABLET ORAL
Qty: 90 TAB | Refills: 0 | Status: SHIPPED | OUTPATIENT
Start: 2017-11-16 | End: 2018-05-04 | Stop reason: SDUPTHER

## 2017-12-04 ENCOUNTER — OFFICE VISIT (OUTPATIENT)
Dept: FAMILY MEDICINE CLINIC | Age: 37
End: 2017-12-04

## 2017-12-04 VITALS
BODY MASS INDEX: 32.76 KG/M2 | HEART RATE: 81 BPM | DIASTOLIC BLOOD PRESSURE: 85 MMHG | RESPIRATION RATE: 16 BRPM | SYSTOLIC BLOOD PRESSURE: 119 MMHG | HEIGHT: 62 IN | WEIGHT: 178 LBS | OXYGEN SATURATION: 98 % | TEMPERATURE: 99.9 F

## 2017-12-04 DIAGNOSIS — J30.1 CHRONIC ALLERGIC RHINITIS DUE TO POLLEN, UNSPECIFIED SEASONALITY: ICD-10-CM

## 2017-12-04 DIAGNOSIS — R05.9 COUGH: Primary | ICD-10-CM

## 2017-12-04 RX ORDER — CODEINE PHOSPHATE AND GUAIFENESIN 10; 100 MG/5ML; MG/5ML
5 SOLUTION ORAL
Qty: 118 ML | Refills: 0 | Status: SHIPPED | OUTPATIENT
Start: 2017-12-04 | End: 2017-12-18

## 2017-12-04 RX ORDER — BENZONATATE 200 MG/1
200 CAPSULE ORAL
Qty: 30 CAP | Refills: 1 | Status: SHIPPED | OUTPATIENT
Start: 2017-12-04 | End: 2017-12-11

## 2017-12-04 RX ORDER — MONTELUKAST SODIUM 10 MG/1
10 TABLET ORAL DAILY
Qty: 30 TAB | Refills: 3 | Status: SHIPPED | OUTPATIENT
Start: 2017-12-04 | End: 2019-11-26

## 2017-12-04 NOTE — PROGRESS NOTES
Chief Complaint   Patient presents with    Cough     productive on/off    Nasal Congestion     started 6 weeks ago    Sinus Pain     started about 6 weeks ago, recurrent problem    Ear Pressure     has muffled hearing both ears, recurrent problem     1. Have you been to the ER, urgent care clinic since your last visit? Hospitalized since your last visit? No    2. Have you seen or consulted any other health care providers outside of the [address] [place] [address] since your last visit? Include any pap smears or colon screening.  No

## 2017-12-04 NOTE — MR AVS SNAPSHOT
Visit Information Date & Time Provider Department Dept. Phone Encounter #  
 12/4/2017  3:00 PM Sophia Chisholm [phone] [id number] Follow-up Instructions Return if symptoms worsen or fail to improve. Upcoming Health Maintenance Date Due Influenza Age 5 to Adult 8/1/2017 PAP AKA CERVICAL CYTOLOGY 3/26/2018 DTaP/Tdap/Td series (2 - Td) 9/21/2024 Allergies as of 12/4/2017  Review Complete On: 45/6/8421 By: Teresita Russell LPN No Known Allergies Current Immunizations  Never Reviewed Name Date Influenza Vaccine 10/29/2016 Tdap 9/21/2014 Not reviewed this visit You Were Diagnosed With   
  
 Codes Comments Cough    -  Primary ICD-10-CM: P11 ICD-9-CM: 772. 2 Chronic allergic rhinitis due to pollen, unspecified seasonality     ICD-10-CM: J30.1 ICD-9-CM: 477.0 Vitals BP Pulse Temp Resp Height(growth percentile) Weight(growth percentile) 119/85 (BP 1 Location: Right arm, BP Patient Position: Sitting) 81 99.9 °F (37.7 °C) (Oral) 16 5' 2\" (1.575 m) 178 lb (80.7 kg) SpO2 BMI OB Status Smoking Status 98% 32.56 kg/m2 Having regular periods Never Smoker BMI and BSA Data Body Mass Index Body Surface Area 32.56 kg/m 2 1.88 m 2 Preferred Pharmacy Pharmacy Name Phone [facility], [address] [place] [address] [phone] Your Updated Medication List  
  
   
This list is accurate as of: 12/4/17  3:46 PM.  Always use your most recent med list.  
  
  
  
  
 albuterol 90 mcg/actuation inhaler Commonly known as:  PROVENTIL HFA, VENTOLIN HFA, PROAIR HFA Take 2 Puffs by inhalation every four (4) hours as needed for Wheezing or Shortness of Breath. benzonatate 200 mg capsule Commonly known as:  TESSALON Take 1 Cap by mouth three (3) times daily as needed for Cough for up to 7 days. escitalopram oxalate 10 mg tablet Commonly known as:  Lamonte Ramp TAKE ONE TABLET BY MOUTH ONE TIME DAILY  
  
 fexofenadine 180 mg tablet Commonly known as:  Ltanya Barron Take  by mouth.  
  
 guaiFENesin-codeine 100-10 mg/5 mL solution Commonly known as:  ROBITUSSIN AC Take 5 mL by mouth nightly as needed for Cough. Max Daily Amount: 5 mL. ibuprofen 800 mg tablet Commonly known as:  MOTRIN  
TAKE 1 TABLET BY MOUTH 3 TIMES A DAY AS NEEDED FOR PAIN  
  
 montelukast 10 mg tablet Commonly known as:  SINGULAIR Take 1 Tab by mouth daily. valACYclovir 500 mg tablet Commonly known as:  VALTREX Take 1 Tab by mouth two (2) times a day. Prescriptions Printed Refills  
 guaiFENesin-codeine (ROBITUSSIN AC) 100-10 mg/5 mL solution 0 Sig: Take 5 mL by mouth nightly as needed for Cough. Max Daily Amount: 5 mL. Class: Print Route: Oral  
  
Prescriptions Sent to Pharmacy Refills  
 montelukast (SINGULAIR) 10 mg tablet 3 Sig: Take 1 Tab by mouth daily. Class: Normal  
 Pharmacy: [address] #: [phone] Route: Oral  
 benzonatate (TESSALON) 200 mg capsule 1 Sig: Take 1 Cap by mouth three (3) times daily as needed for Cough for up to 7 days. Class: Normal  
 Pharmacy: [address] #: [phone] Route: Oral  
  
Follow-up Instructions Return if symptoms worsen or fail to improve. Patient Instructions Please contact our office if you have any questions about your visit today. 1.) Use Tessalon pearls as needed for cough. 2.) Switch to Singulair for daily allergies. 3.) Use Cheratussin as needed at night for the cough. 4.) Follow up in if no improvement in 10 days. Introducing [facility] & HEALTH SERVICES!    
 Isabel San introduces [employer] patient portal. Now you can access parts of your medical record, email your doctor's office, and request medication refills online. 1. In your internet browser, go to [email or web address] 2. Click on the First Time User? Click Here link in the Sign In box. You will see the New Member Sign Up page. 3. Enter your [employer] Access Code exactly as it appears below. You will not need to use this code after youve completed the sign-up process. If you do not sign up before the expiration date, you must request a new code. · [employer] Access Code: [email or web address] Expires: 2/8/2018 10:36 AM 
 
4. Enter the last four digits of your Social Security Number (xxxx) and Date of Birth (mm/dd/yyyy) as indicated and click Submit. You will be taken to the next sign-up page. 5. Create a [employer] ID. This will be your [employer] login ID and cannot be changed, so think of one that is secure and easy to remember. 6. Create a [employer] password. You can change your password at any time. 7. Enter your Password Reset Question and Answer. This can be used at a later time if you forget your password. 8. Enter your e-mail address. You will receive e-mail notification when new information is available in [address] 9. Click Sign Up. You can now view and download portions of your medical record. 10. Click the Download Summary menu link to download a portable copy of your medical information. If you have questions, please visit the Frequently Asked Questions section of the [employer] website. Remember, [employer] is NOT to be used for urgent needs. For medical emergencies, dial 911. Now available from your iPhone and Android! Please provide this summary of care documentation to your next provider. If you have any questions after today's visit, please call [phone].

## 2017-12-04 NOTE — PROGRESS NOTES
Progress Note    Patient: Marleen Bush MRN: [id number]  SSN: [id number]    YOB: 1980  Age: 44 y.o. Sex: female          Subjective:   Marleen Bush is a 44 y.o. female who is here for an acute care visit. Marleen Bush states that the incident occurred about 6 weeks of nasal congestion, cough and runny nose. She mentions that she usually gets these bouts early in January but this time it is early. She states that she takes Zyrtec and DayQuil for her symptoms. She states that she has not had any real improvement with her symptoms. She states that she has not had a fever. Denies any N/V. She denies any chills. She mentions that her neck and shoulder are mor tender than normal. She also states that she has not had any wheeziness with her cough. She did have an episode of chest congestion after a vigorous biking expedition with her child. She denies any sick contacts as well. She mentions that she does get some drainage down the back of her throat. Objective:     Vitals:    12/04/17 1523   BP: 119/85   Pulse: 81   Resp: 16   Temp: 99.9 °F (37.7 °C)   TempSrc: Oral   SpO2: 98%   Weight: 178 lb (80.7 kg)   Height: 5' 2\" (1.575 m)        Review of Systems:  Pertinent items are noted in the History of Present Illness. Physical Exam:   GENERAL: alert, cooperative, no distress, appears stated age, mildly obese  EYE: conjunctivae/corneas clear. PERRL, EOM's intact. Fundi benign  ENT: No fluid noted behind tympanic membranes bilaterally. Increased nasal turbinate bogginess bilaterally---worse on the left. LYMPHATIC: Cervical adenopathy: left. THROAT & NECK: mild erythema  LUNG: clear to auscultation bilaterally  HEART: regular rate and rhythm, S1, S2 normal, no murmur, click, rub or gallop  ABDOMEN: soft, non-tender. Bowel sounds normal. No masses,  no organomegaly    Lab/Data Review:  No new labs to review       Assessment:     1.) Allergic Rhinitis: Patient placed on Singular for management. 2.) Cough: Patient will be placed on Cheratussin for symptomatic treatment of cough at night. Tessalon pearls may be used during the daytime. The patient will follow up as needed.       Plan:     Orders Placed This Encounter    guaiFENesin-codeine (ROBITUSSIN AC) 100-10 mg/5 mL solution    montelukast (SINGULAIR) 10 mg tablet    benzonatate (TESSALON) 200 mg capsule         Signed By: Glory Hartman DO     December 4, 2017

## 2017-12-04 NOTE — PATIENT INSTRUCTIONS
Please contact our office if you have any questions about your visit today. 1.) Use Tessalon pearls as needed for cough. 2.) Switch to Singulair for daily allergies. 3.) Use Cheratussin as needed at night for the cough. 4.) Follow up in if no improvement in 10 days.

## 2017-12-18 ENCOUNTER — OFFICE VISIT (OUTPATIENT)
Dept: FAMILY MEDICINE CLINIC | Age: 37
End: 2017-12-18

## 2017-12-18 VITALS
BODY MASS INDEX: 33.2 KG/M2 | DIASTOLIC BLOOD PRESSURE: 79 MMHG | HEART RATE: 89 BPM | TEMPERATURE: 98.3 F | HEIGHT: 62 IN | WEIGHT: 180.4 LBS | SYSTOLIC BLOOD PRESSURE: 130 MMHG | OXYGEN SATURATION: 97 % | RESPIRATION RATE: 16 BRPM

## 2017-12-18 DIAGNOSIS — R06.02 SHORTNESS OF BREATH: Primary | ICD-10-CM

## 2017-12-18 DIAGNOSIS — J40 BRONCHITIS: ICD-10-CM

## 2017-12-18 RX ORDER — CEFUROXIME AXETIL 500 MG/1
500 TABLET ORAL 2 TIMES DAILY
Qty: 20 TAB | Refills: 0 | Status: SHIPPED | OUTPATIENT
Start: 2017-12-18 | End: 2018-09-19 | Stop reason: ALTCHOICE

## 2017-12-18 RX ORDER — PREDNISONE 20 MG/1
TABLET ORAL
Qty: 10 TAB | Refills: 0 | Status: SHIPPED | OUTPATIENT
Start: 2017-12-18 | End: 2017-12-26 | Stop reason: ALTCHOICE

## 2017-12-18 NOTE — PROGRESS NOTES
HISTORY OF PRESENT ILLNESS  Elmira Contreras is a 44 y.o. female. Patient presents today with cough and shortness of breath. Chief Complaint   Patient presents with    Cough    Shortness of Breath       HPI  Pt still sick from October. She has this cough that keeps coming back. She just can't get rid of it totally. Review of Systems   Constitutional: Negative. HENT: Positive for congestion and sinus pain. Respiratory: Positive for cough and shortness of breath. Cardiovascular: Negative. Gastrointestinal: Negative. Visit Vitals    /79 (BP 1 Location: Left arm, BP Patient Position: Sitting)    Pulse 89    Temp 98.3 °F (36.8 °C) (Oral)    Resp 16    Ht 5' 2\" (1.575 m)    Wt 180 lb 6.4 oz (81.8 kg)    LMP 12/08/2017    SpO2 97%    BMI 33 kg/m2       Physical Exam   Constitutional: She appears well-developed and well-nourished. No distress. HENT:   Right Ear: A middle ear effusion is present. Left Ear: A middle ear effusion is present. Nose: Mucosal edema present. Mouth/Throat: Posterior oropharyngeal erythema present. Cardiovascular: Normal rate, regular rhythm and normal heart sounds. Pulmonary/Chest: Effort normal. No respiratory distress. She has decreased breath sounds in the right upper field, the right middle field, the right lower field, the left upper field, the left middle field and the left lower field. ASSESSMENT and PLAN    ICD-10-CM ICD-9-CM    1. Shortness of breath R06.02 786.05 predniSONE (DELTASONE) 20 mg tablet   2. Bronchitis J40 490 predniSONE (DELTASONE) 20 mg tablet      cefUROXime (CEFTIN) 500 mg tablet     PLAN:  Pt instructed on how to take the Prednisone 20mg take two tablets once a day in the am with food for 5 days. Pt is to call before the end of this week if she sees no improvement. She is to call with any concerns. Consider a course of Advair bid for a month or two to improve inflammatory response. Pt given after visit summary.

## 2017-12-18 NOTE — LETTER
NOTIFICATION RETURN TO WORK / SCHOOL 
 
12/18/2017 3:29 PM 
 
Ms. Blake Daniels [place] [phone] To Whom It May Concern: 
 
Blake Daniels [facility] . She was seen today for sick visit. Please excuse from 12-18-17 through 12-21-17. If there are questions or concerns please have the patient contact our office. Sincerely, Prabha Cook NP

## 2017-12-18 NOTE — MR AVS SNAPSHOT
Visit Information Date & Time Provider Department Dept. Phone Encounter #  
 12/18/2017  2:30 PM Rc Rosario NP Jobe [facility] [address] [facility] [id number] Follow-up Instructions Return if symptoms worsen or fail to improve. Upcoming Health Maintenance Date Due  
 PAP AKA CERVICAL CYTOLOGY 3/26/2018 DTaP/Tdap/Td series (2 - Td) 9/21/2024 Allergies as of 12/18/2017  Review Complete On: 12/18/2017 By: Rc Rosario NP No Known Allergies Current Immunizations  Never Reviewed Name Date Influenza Vaccine 10/29/2016 Tdap 9/21/2014 12:00 AM  
  
 Not reviewed this visit You Were Diagnosed With   
  
 Codes Comments Shortness of breath    -  Primary ICD-10-CM: R06.02 
ICD-9-CM: 786.05 Bronchitis     ICD-10-CM: J40 ICD-9-CM: 950 Vitals BP Pulse Temp Resp Height(growth percentile) Weight(growth percentile) 130/79 (BP 1 Location: Left arm, BP Patient Position: Sitting) 89 98.3 °F (36.8 °C) (Oral) 16 5' 2\" (1.575 m) 180 lb 6.4 oz (81.8 kg) LMP SpO2 BMI OB Status Smoking Status 12/08/2017 97% 33 kg/m2 Having regular periods Never Smoker BMI and BSA Data Body Mass Index Body Surface Area  
 33 kg/m 2 1.89 m 2 Preferred Pharmacy Pharmacy Name Phone [facility], [address] [place] [address] [phone] Your Updated Medication List  
  
   
This list is accurate as of: 12/18/17  3:27 PM.  Always use your most recent med list.  
  
  
  
  
 albuterol 90 mcg/actuation inhaler Commonly known as:  PROVENTIL HFA, VENTOLIN HFA, PROAIR HFA Take 2 Puffs by inhalation every four (4) hours as needed for Wheezing or Shortness of Breath. cefUROXime 500 mg tablet Commonly known as:  CEFTIN Take 1 Tab by mouth two (2) times a day. escitalopram oxalate 10 mg tablet Commonly known as:  Cherre Jews TAKE ONE TABLET BY MOUTH ONE TIME DAILY  
  
 ibuprofen 800 mg tablet Commonly known as:  MOTRIN  
TAKE 1 TABLET BY MOUTH 3 TIMES A DAY AS NEEDED FOR PAIN  
  
 montelukast 10 mg tablet Commonly known as:  SINGULAIR Take 1 Tab by mouth daily. predniSONE 20 mg tablet Commonly known as:  Merle Parvez Take two tablets in am for 5 days with food. Prescriptions Sent to Pharmacy Refills  
 predniSONE (DELTASONE) 20 mg tablet 0 Sig: Take two tablets in am for 5 days with food. Class: Normal  
 Pharmacy: [address] #: [phone] cefUROXime (CEFTIN) 500 mg tablet 0 Sig: Take 1 Tab by mouth two (2) times a day. Class: Normal  
 Pharmacy: [address] #: [phone] Route: Oral  
  
Follow-up Instructions Return if symptoms worsen or fail to improve. Introducing [facility]! [facility] introduces [employer] patient portal. Now you can access parts of your medical record, email your doctor's office, and request medication refills online. 1. In your internet browser, go to [email or web address] 2. Click on the First Time User? Click Here link in the Sign In box. You will see the New Member Sign Up page. 3. Enter your [employer] Access Code exactly as it appears below. You will not need to use this code after youve completed the sign-up process. If you do not sign up before the expiration date, you must request a new code. · [employer] Access Code: [email or web address] Expires: 2/8/2018 10:36 AM 
 
4. Enter the last four digits of your Social Security Number (xxxx) and Date of Birth (mm/dd/yyyy) as indicated and click Submit. You will be taken to the next sign-up page. 5. Create a RxResultst ID. This will be your [employer] login ID and cannot be changed, so think of one that is secure and easy to remember. 6. Create a [employer] password. You can change your password at any time. 7. Enter your Password Reset Question and Answer. This can be used at a later time if you forget your password. 8. Enter your e-mail address. You will receive e-mail notification when new information is available in [address] 9. Click Sign Up. You can now view and download portions of your medical record. 10. Click the Download Summary menu link to download a portable copy of your medical information. If you have questions, please visit the Frequently Asked Questions section of the [email or web address] website. Remember, [email or web address] is NOT to be used for urgent needs. For medical emergencies, dial 911. Now available from your iPhone and Android! Please provide this summary of care documentation to your next provider. If you have any questions after today's visit, please call [phone].

## 2017-12-22 ENCOUNTER — TELEPHONE (OUTPATIENT)
Dept: FAMILY MEDICINE CLINIC | Age: 37
End: 2017-12-22

## 2017-12-22 DIAGNOSIS — J40 BRONCHITIS: Primary | ICD-10-CM

## 2017-12-22 RX ORDER — HYDROCODONE POLISTIREX AND CHLORPHENIRAMINE POLISTIREX 10; 8 MG/5ML; MG/5ML
5 SUSPENSION, EXTENDED RELEASE ORAL
Qty: 115 ML | Refills: 0 | Status: SHIPPED | OUTPATIENT
Start: 2017-12-22 | End: 2017-12-26 | Stop reason: SDUPTHER

## 2017-12-22 NOTE — TELEPHONE ENCOUNTER
Pt states cough has increased since Monday visit and was instructed by ZAIRA Frost to call and request a stronger prescription

## 2017-12-26 ENCOUNTER — OFFICE VISIT (OUTPATIENT)
Dept: FAMILY MEDICINE CLINIC | Age: 37
End: 2017-12-26

## 2017-12-26 VITALS
RESPIRATION RATE: 16 BRPM | HEART RATE: 85 BPM | SYSTOLIC BLOOD PRESSURE: 114 MMHG | TEMPERATURE: 97.9 F | WEIGHT: 180 LBS | DIASTOLIC BLOOD PRESSURE: 78 MMHG | HEIGHT: 62 IN | OXYGEN SATURATION: 97 % | BODY MASS INDEX: 33.13 KG/M2

## 2017-12-26 DIAGNOSIS — R05.9 COUGH: Primary | ICD-10-CM

## 2017-12-26 DIAGNOSIS — J40 BRONCHITIS: ICD-10-CM

## 2017-12-26 LAB
S PYO AG THROAT QL: NEGATIVE
VALID INTERNAL CONTROL?: YES

## 2017-12-26 RX ORDER — HYDROCODONE POLISTIREX AND CHLORPHENIRAMINE POLISTIREX 10; 8 MG/5ML; MG/5ML
5 SUSPENSION, EXTENDED RELEASE ORAL
Qty: 115 ML | Refills: 0 | Status: SHIPPED | OUTPATIENT
Start: 2017-12-26 | End: 2018-10-10 | Stop reason: ALTCHOICE

## 2017-12-26 RX ORDER — ALBUTEROL SULFATE 90 UG/1
1 AEROSOL, METERED RESPIRATORY (INHALATION)
Qty: 1 INHALER | Refills: 0 | Status: SHIPPED | OUTPATIENT
Start: 2017-12-26 | End: 2018-03-14 | Stop reason: SDUPTHER

## 2017-12-26 RX ORDER — AZITHROMYCIN 250 MG/1
TABLET, FILM COATED ORAL
Qty: 6 TAB | Refills: 0 | Status: SHIPPED | OUTPATIENT
Start: 2017-12-26 | End: 2017-12-31

## 2017-12-26 NOTE — LETTER
NOTIFICATION RETURN TO WORK / SCHOOL 
 
12/26/2017 1:24 PM 
 
Ms. Denise Kerns [facility] [phone] To Whom It May Concern: 
 
Denise Kerns is currently under the care of [address] [place] [address]. She will return to work/school on: 12/28/17 If there are questions or concerns please have the patient contact our office. Sincerely, Tyson Tapia MD

## 2017-12-26 NOTE — PROGRESS NOTES
Chief Complaint   Patient presents with    Cough     1. Have you been to the ER, urgent care clinic since your last visit? Hospitalized since your last visit? No    2. Have you seen or consulted any other health care providers outside of the [address] [place] [address] since your last visit? Include any pap smears or colon screening.  No

## 2017-12-26 NOTE — PROGRESS NOTES
HISTORY OF PRESENT ILLNESS  Elmira Contreras is a 44 y.o. female. Cough   The history is provided by the patient. This is a new problem. The current episode started more than 2 days ago. The problem occurs constantly. The problem has been gradually worsening. Associated symptoms include headaches and shortness of breath. Pertinent negatives include no chest pain and no abdominal pain. The symptoms are aggravated by coughing. Review of Systems   Constitutional: Positive for malaise/fatigue. Negative for chills and fever. HENT: Positive for congestion and ear pain. Negative for hearing loss, sinus pain and sore throat. Respiratory: Positive for cough and shortness of breath. Negative for sputum production and wheezing. Cardiovascular: Negative for chest pain. Gastrointestinal: Positive for nausea. Negative for abdominal pain and vomiting. Genitourinary: Negative for dysuria. Musculoskeletal: Negative for joint pain. Neurological: Positive for dizziness and headaches. Negative for sensory change, speech change and focal weakness. Psychiatric/Behavioral: The patient is not nervous/anxious. Visit Vitals    /78    Pulse 85    Temp 97.9 °F (36.6 °C) (Oral)    Resp 16    Ht 5' 2\" (1.575 m)    Wt 180 lb (81.6 kg)    SpO2 97%    BMI 32.92 kg/m2       Physical Exam   Constitutional: She is oriented to person, place, and time. She appears well-developed and well-nourished. No distress. HENT:   Head: Normocephalic and atraumatic. Left Ear: External ear normal.   Mouth/Throat: Posterior oropharyngeal erythema present. No oropharyngeal exudate. Eyes: EOM are normal. Pupils are equal, round, and reactive to light. No scleral icterus. Neck: Normal range of motion. Cardiovascular: Normal rate, regular rhythm and normal heart sounds. Pulmonary/Chest: Effort normal and breath sounds normal. No respiratory distress. She has no wheezes. Abdominal: Soft.  Bowel sounds are normal. Lymphadenopathy:     She has no cervical adenopathy. Neurological: She is alert and oriented to person, place, and time. Psychiatric: She has a normal mood and affect. ASSESSMENT and PLAN  Bronchitis :  1) Post- bronchitic cough can last for up to 4-6 weeks. 2) Please make sure you drink plenty of fluids to prevent dehydration. 3) It is ok to use cough drops , over the counter cough suppressants and expectorants as discussed in office. 4) You can use tylenol and ibuprofen for fever and body aches. 5) Please make sure you get enough rest , ok to take vitamin C 1000 mg for 1-2 weeks. 6) Avoid smoking if applicable to you and exposure to smoke. 7) Keep a humidifier around you for heat and moisture to better aid the easier coughing up of expectorant. 8) Please use inhaler as instructed. 9) Complete course of antibiotics as prescribed today,explained side effects, patient verbalized understanding.

## 2017-12-27 PROBLEM — F33.9 RECURRENT DEPRESSION (HCC): Status: ACTIVE | Noted: 2017-12-27

## 2018-01-03 ENCOUNTER — OFFICE VISIT (OUTPATIENT)
Dept: FAMILY MEDICINE CLINIC | Age: 38
End: 2018-01-03

## 2018-01-03 VITALS
WEIGHT: 182.4 LBS | RESPIRATION RATE: 16 BRPM | HEART RATE: 92 BPM | DIASTOLIC BLOOD PRESSURE: 75 MMHG | SYSTOLIC BLOOD PRESSURE: 124 MMHG | TEMPERATURE: 98.2 F | HEIGHT: 62 IN | BODY MASS INDEX: 33.57 KG/M2 | OXYGEN SATURATION: 97 %

## 2018-01-03 DIAGNOSIS — J40 BRONCHITIS: Primary | ICD-10-CM

## 2018-01-03 DIAGNOSIS — R05.9 COUGH: ICD-10-CM

## 2018-01-03 NOTE — PROGRESS NOTES
Chief Complaint   Patient presents with    Cough     c/o productive cough greenish in color pt states has had cough since october. 1. Have you been to the ER, urgent care clinic since your last visit? Hospitalized since your last visit? No    2. Have you seen or consulted any other health care providers outside of the [address] [place] [address] since your last visit? Include any pap smears or colon screening.  No

## 2018-01-03 NOTE — PROGRESS NOTES
HISTORY OF PRESENT ILLNESS  Olivier Chadwick is a 44 y.o. female. HPI Comments: Patient mentions her vertigo has cleared up and she has taken the antibiotic but continues to have a cough with greenish phlegm, Most the time the cough is dry and at night time she coughs up a greenish phlegm. I have advised her to begin Flonase for one week and if this does not help I will order an chest x-ray. Cough   The history is provided by the patient. This is a new problem. The problem occurs constantly. The problem has been gradually improving (cough is getting better but is still present). Pertinent negatives include no chest pain, no abdominal pain, no headaches and no shortness of breath. The symptoms are aggravated by coughing. The symptoms are relieved by medications. Treatments tried: antibiotic and inhaler use. The treatment provided mild relief. Review of Systems   Constitutional: Negative for chills, fever and malaise/fatigue. HENT: Negative for congestion, sinus pain and sore throat. Eyes: Negative for blurred vision, double vision, pain and discharge. Respiratory: Positive for cough and sputum production. Negative for shortness of breath and wheezing. Cardiovascular: Negative for chest pain, palpitations and leg swelling. Gastrointestinal: Negative for abdominal pain, constipation, diarrhea, nausea and vomiting. Genitourinary: Negative for dysuria and frequency. Musculoskeletal: Negative for joint pain. Neurological: Negative for dizziness, focal weakness, weakness and headaches. Psychiatric/Behavioral: Negative for depression. The patient is not nervous/anxious. Visit Vitals    /75 (BP 1 Location: Right arm, BP Patient Position: Sitting)    Pulse 92    Temp 98.2 °F (36.8 °C) (Oral)    Resp 16    Ht 5' 2\" (1.575 m)    Wt 182 lb 6.4 oz (82.7 kg)    SpO2 97%    BMI 33.36 kg/m2       Physical Exam   Constitutional: She is oriented to person, place, and time.  She appears well-developed and well-nourished. No distress. HENT:   Head: Normocephalic and atraumatic. Right Ear: External ear normal.   Left Ear: External ear normal.   Mouth/Throat: Posterior oropharyngeal erythema present. No oropharyngeal exudate. Eyes: EOM are normal. Pupils are equal, round, and reactive to light. No scleral icterus. Neck: Normal range of motion. No thyromegaly present. Cardiovascular: Normal rate, regular rhythm and normal heart sounds. Pulmonary/Chest: Effort normal and breath sounds normal. No respiratory distress. She has no wheezes. Abdominal: Soft. Bowel sounds are normal. She exhibits no distension. There is no tenderness. Lymphadenopathy:     She has no cervical adenopathy. Neurological: She is alert and oriented to person, place, and time. Psychiatric: She has a normal mood and affect.        ASSESSMENT and PLAN  Post -bronchitic cough :  - Please use inhaler as needed  - Ok to use flonase

## 2018-03-14 ENCOUNTER — OFFICE VISIT (OUTPATIENT)
Dept: FAMILY MEDICINE CLINIC | Age: 38
End: 2018-03-14

## 2018-03-14 VITALS
TEMPERATURE: 98.2 F | WEIGHT: 183 LBS | SYSTOLIC BLOOD PRESSURE: 112 MMHG | HEART RATE: 92 BPM | OXYGEN SATURATION: 100 % | RESPIRATION RATE: 18 BRPM | HEIGHT: 62 IN | DIASTOLIC BLOOD PRESSURE: 63 MMHG | BODY MASS INDEX: 33.68 KG/M2

## 2018-03-14 DIAGNOSIS — J01.11 ACUTE RECURRENT FRONTAL SINUSITIS: Primary | ICD-10-CM

## 2018-03-14 DIAGNOSIS — J06.9 VIRAL UPPER RESPIRATORY TRACT INFECTION: ICD-10-CM

## 2018-03-14 RX ORDER — ALBUTEROL SULFATE 90 UG/1
1 AEROSOL, METERED RESPIRATORY (INHALATION)
Qty: 1 INHALER | Refills: 0 | Status: SHIPPED | OUTPATIENT
Start: 2018-03-14 | End: 2018-10-10 | Stop reason: ALTCHOICE

## 2018-03-14 RX ORDER — FLUTICASONE PROPIONATE 50 MCG
2 SPRAY, SUSPENSION (ML) NASAL DAILY
Qty: 1 BOTTLE | Refills: 3 | Status: SHIPPED | OUTPATIENT
Start: 2018-03-14 | End: 2018-09-05 | Stop reason: SDUPTHER

## 2018-03-14 RX ORDER — AZITHROMYCIN 250 MG/1
TABLET, FILM COATED ORAL
Qty: 6 TAB | Refills: 0 | Status: SHIPPED | OUTPATIENT
Start: 2018-03-14 | End: 2018-03-19

## 2018-03-14 NOTE — PROGRESS NOTES
Patient is here for cough, has been going for 1 week and half. ..1. Have you been to the ER, urgent care clinic since your last visit? Hospitalized since your last visit?no    2. Have you seen or consulted any other health care providers outside of the [address] [place] [address] since your last visit? Include any pap smears or colon screening.  no

## 2018-03-14 NOTE — PROGRESS NOTES
HISTORY OF PRESENT ILLNESS  Katie Smith is a 44 y.o. female. Cough   The history is provided by the patient. This is a new problem. The current episode started more than 1 week ago. The problem occurs constantly. The problem has been gradually worsening. Associated symptoms include headaches. Pertinent negatives include no chest pain, no abdominal pain and no shortness of breath. The symptoms are aggravated by coughing. Nothing relieves the symptoms. She has tried nothing for the symptoms. Review of Systems   Constitutional: Negative for chills, fever and malaise/fatigue. HENT: Positive for congestion and sinus pain. Negative for ear pain, hearing loss and sore throat. Eyes: Negative for blurred vision, double vision, pain and discharge. Respiratory: Positive for cough and sputum production. Negative for shortness of breath and wheezing. Cardiovascular: Negative for chest pain, palpitations, orthopnea and leg swelling. Gastrointestinal: Positive for nausea. Negative for abdominal pain, constipation, diarrhea and vomiting. Genitourinary: Negative for dysuria. Musculoskeletal: Negative for joint pain. Neurological: Positive for headaches. Negative for tingling and focal weakness. Psychiatric/Behavioral: The patient is not nervous/anxious. Visit Vitals    /63    Pulse 92    Temp 98.2 °F (36.8 °C) (Oral)    Resp 18    Ht 5' 2\" (1.575 m)    Wt 183 lb (83 kg)    SpO2 100%    BMI 33.47 kg/m2       Physical Exam   Constitutional: She is oriented to person, place, and time. She appears well-developed and well-nourished. No distress. HENT:   Head: Normocephalic and atraumatic. Right Ear: External ear normal.   Left Ear: External ear normal.   Mouth/Throat: Posterior oropharyngeal erythema present. No oropharyngeal exudate. Eyes: EOM are normal. Pupils are equal, round, and reactive to light. No scleral icterus. Neck: Normal range of motion. No thyromegaly present. Cardiovascular: Normal rate, regular rhythm and normal heart sounds. Pulmonary/Chest: Effort normal and breath sounds normal. No respiratory distress. She has no wheezes. Abdominal: Soft. Bowel sounds are normal. She exhibits no distension. There is no tenderness. Lymphadenopathy:     She has no cervical adenopathy. Neurological: She is alert and oriented to person, place, and time. Psychiatric: She has a normal mood and affect. ASSESSMENT and PLAN  Sinusitis :  1) Ok to take tylenol / motrin to relieve pain. 2) Please finish course of antibiotics , explained side effects and patient verbalizes understanding. 3) Mucolytic's such as guaifenesin which can thin out the mucous. 4) Use nasal steroid inhaler and anti-allergy medication. 5) Can use nasal saline spray or Neti-pot. 6) Please keep a humidifier in your bedroom. 7) Patient instructions and information on diagnosis to be handed out.

## 2018-05-04 DIAGNOSIS — F43.22 ADJUSTMENT DISORDER WITH ANXIOUS MOOD: ICD-10-CM

## 2018-05-04 RX ORDER — ESCITALOPRAM OXALATE 10 MG/1
TABLET ORAL
Qty: 90 TAB | Refills: 0 | Status: SHIPPED | OUTPATIENT
Start: 2018-05-04 | End: 2018-09-19 | Stop reason: SDUPTHER

## 2018-05-04 NOTE — TELEPHONE ENCOUNTER
Pt called in requesting refill of her   Requested Prescriptions     Pending Prescriptions Disp Refills    escitalopram oxalate (LEXAPRO) 10 mg tablet 90 Tab 0     Sig: TAKE ONE TABLET BY MOUTH ONE TIME DAILY   .

## 2018-08-30 DIAGNOSIS — F43.22 ADJUSTMENT DISORDER WITH ANXIOUS MOOD: ICD-10-CM

## 2018-08-30 RX ORDER — ESCITALOPRAM OXALATE 10 MG/1
TABLET ORAL
Qty: 90 TAB | Refills: 0 | OUTPATIENT
Start: 2018-08-30

## 2018-09-05 DIAGNOSIS — J06.9 VIRAL UPPER RESPIRATORY TRACT INFECTION: ICD-10-CM

## 2018-09-05 NOTE — TELEPHONE ENCOUNTER
Per fax from [facility] requesting a 90 day supply  Requested Prescriptions     Pending Prescriptions Disp Refills    fluticasone (FLONASE) 50 mcg/actuation nasal spray 1 Bottle 3     Si Sprays by Both Nostrils route daily.

## 2018-09-19 ENCOUNTER — OFFICE VISIT (OUTPATIENT)
Dept: FAMILY MEDICINE CLINIC | Age: 38
End: 2018-09-19

## 2018-09-19 VITALS
SYSTOLIC BLOOD PRESSURE: 112 MMHG | OXYGEN SATURATION: 97 % | RESPIRATION RATE: 16 BRPM | TEMPERATURE: 98.5 F | HEART RATE: 100 BPM | BODY MASS INDEX: 34.56 KG/M2 | DIASTOLIC BLOOD PRESSURE: 72 MMHG | HEIGHT: 62 IN | WEIGHT: 187.8 LBS

## 2018-09-19 DIAGNOSIS — F43.22 ADJUSTMENT DISORDER WITH ANXIOUS MOOD: ICD-10-CM

## 2018-09-19 RX ORDER — ESCITALOPRAM OXALATE 20 MG/1
20 TABLET ORAL DAILY
Qty: 90 TAB | Refills: 1 | Status: SHIPPED | OUTPATIENT
Start: 2018-09-19 | End: 2019-03-11 | Stop reason: SDUPTHER

## 2018-09-19 NOTE — PATIENT INSTRUCTIONS
Recovering From Depression: Care Instructions  Your Care Instructions    Taking good care of yourself is important as you recover from depression. In time, your symptoms will fade as your treatment takes hold. Do not give up. Instead, focus your energy on getting better. Your mood will improve. It just takes some time. Focus on things that can help you feel better, such as being with friends and family, eating well, and getting enough rest. But take things slowly. Do not do too much too soon. You will begin to feel better gradually. Follow-up care is a key part of your treatment and safety. Be sure to make and go to all appointments, and call your doctor if you are having problems. It's also a good idea to know your test results and keep a list of the medicines you take. How can you care for yourself at home? Be realistic  · If you have a large task to do, break it up into smaller steps you can handle, and just do what you can. · You may want to put off important decisions until your depression has lifted. If you have plans that will have a major impact on your life, such as marriage, divorce, or a job change, try to wait a bit. Talk it over with friends and loved ones who can help you look at the overall picture first.  · Reaching out to people for help is important. Do not isolate yourself. Let your family and friends help you. Find someone you can trust and confide in, and talk to that person. · Be patient, and be kind to yourself. Remember that depression is not your fault and is not something you can overcome with willpower alone. Treatment is necessary for depression, just like for any other illness. Feeling better takes time, and your mood will improve little by little. Stay active  · Stay busy and get outside. Take a walk, or try some other light exercise. · Talk with your doctor about an exercise program. Exercise can help with mild depression. · Go to a movie or concert.  Take part in a Quaker activity or other social gathering. Go to a [employer] game. · Ask a friend to have dinner with you. Take care of yourself  · Eat a balanced diet with plenty of fresh fruits and vegetables, whole grains, and lean protein. If you have lost your appetite, eat small snacks rather than large meals. · Avoid drinking alcohol or using illegal drugs. Do not take medicines that have not been prescribed for you. They may interfere with medicines you may be taking for depression, or they may make your depression worse. · Take your medicines exactly as they are prescribed. You may start to feel better within 1 to 3 weeks of taking antidepressant medicine. But it can take as many as 6 to 8 weeks to see more improvement. If you have questions or concerns about your medicines, or if you do not notice any improvement by 3 weeks, talk to your doctor. · If you have any side effects from your medicine, tell your doctor. Antidepressants can make you feel tired, dizzy, or nervous. Some people have dry mouth, constipation, headaches, sexual problems, or diarrhea. Many of these side effects are mild and will go away on their own after you have been taking the medicine for a few weeks. Some may last longer. Talk to your doctor if side effects are bothering you too much. You might be able to try a different medicine. · Get enough sleep. If you have problems sleeping:  ¨ Go to bed at the same time every night, and get up at the same time every morning. ¨ Keep your bedroom dark and quiet. ¨ Do not exercise after 5:00 p.m. ¨ Avoid drinks with caffeine after 5:00 p.m. · Avoid sleeping pills unless they are prescribed by the doctor treating your depression. Sleeping pills may make you groggy during the day, and they may interact with other medicine you are taking. · If you have any other illnesses, such as diabetes, heart disease, or high blood pressure, make sure to continue with your treatment.  Tell your doctor about all of the medicines you take, including those with or without a prescription. · Keep the numbers for these national suicide hotlines: 5-649-825-TALK (3-645.839.6802) and 0-243-EEIYEET (1-895.308.8230). If you or someone you know talks about suicide or feeling hopeless, get help right away. When should you call for help? Call 911 anytime you think you may need emergency care. For example, call if:    · You feel like hurting yourself or someone else.     · Someone you know has depression and is about to attempt or is attempting suicide.   [facility] your doctor now or seek immediate medical care if:    · You hear voices.     · Someone you know has depression and:  ¨ Starts to give away his or her possessions. ¨ Uses illegal drugs or drinks alcohol heavily. ¨ Talks or writes about death, including writing suicide notes or talking about guns, knives, or pills. ¨ Starts to spend a lot of time alone. ¨ Acts very aggressively or suddenly appears calm.    Watch closely for changes in your health, and be sure to contact your doctor if:    · You do not get better as expected. Where can you learn more? Go to http://lynn-palma.info/. Enter S698 in the search box to learn more about \"Recovering From Depression: Care Instructions. \"  Current as of: December 7, 2017  Content Version: 11.7  © 1794-8171 [employer]. Care instructions adapted under license by [employer] (which disclaims liability or warranty for this information). If you have questions about a medical condition or this instruction, always ask your healthcare professional. [place] [address] any warranty or liability for your use of this information.

## 2018-09-19 NOTE — PROGRESS NOTES
HISTORY OF PRESENT ILLNESS  Morro Elmore is a 43 y.o. female. Patient presents for mood disorder. HPI  Pt feels like her dose needs to be increased. Otherwise she feels like the medication helps. Review of Systems   Constitutional: Negative. Respiratory: Negative. Cardiovascular: Negative. Psychiatric/Behavioral: The patient is nervous/anxious. Visit Vitals    /72 (BP 1 Location: Left arm)    Pulse 100    Temp 98.5 °F (36.9 °C) (Oral)    Resp 16    Ht 5' 2\" (1.575 m)    Wt 187 lb 12.8 oz (85.2 kg)    LMP 09/02/2018 (Exact Date)    SpO2 97%    BMI 34.35 kg/m2       Physical Exam   Constitutional: She appears well-developed. No distress. Cardiovascular: Normal rate, regular rhythm and normal heart sounds. No murmur heard. Pulmonary/Chest: Effort normal and breath sounds normal. No respiratory distress. She has no wheezes. She has no rales. Psychiatric: She has a normal mood and affect. Her behavior is normal. Judgment and thought content normal.       ASSESSMENT and PLAN    ICD-10-CM ICD-9-CM    1. Adjustment disorder with anxious mood F43.22 309.24 escitalopram oxalate (LEXAPRO) 20 mg tablet     PLAN:  We discussed her symptoms and we decided to increase Lexapro from 10mg to 20mg  Pt will call with any concerns.     Pt given after visit summary

## 2018-09-19 NOTE — MR AVS SNAPSHOT
Yadira Grate 
 
 
 [address], [place] [address] [facility] [address] 
[phone] Patient: Aj Coulter MRN: [id number] :1980 Visit Information Date & Time Provider Department Dept. Phone Encounter #  
 2018  3:20 PM Yunior Petty NP [facility] [address] [id number] Follow-up Instructions Return in about 6 months (around 3/19/2019). Upcoming Health Maintenance Date Due  
 PAP AKA CERVICAL CYTOLOGY 3/26/2018 Influenza Age 5 to Adult 2018 DTaP/Tdap/Td series (2 - Td) 2024 Allergies as of 2018  Review Complete On: 2018 By: Marbin Elliott LPN No Known Allergies Current Immunizations  Never Reviewed Name Date Influenza Vaccine 10/29/2016 Tdap 2014 12:00 AM  
  
 Not reviewed this visit You Were Diagnosed With   
  
 Codes Comments Adjustment disorder with anxious mood     ICD-10-CM: F43.22 
ICD-9-CM: 309.24 Vitals BP Pulse Temp Resp Height(growth percentile) Weight(growth percentile) 112/72 (BP 1 Location: Left arm) 100 98.5 °F (36.9 °C) (Oral) 16 5' 2\" (1.575 m) 187 lb 12.8 oz (85.2 kg) LMP SpO2 BMI OB Status Smoking Status 2018 (Exact Date) 97% 34.35 kg/m2 Having regular periods Never Smoker BMI and BSA Data Body Mass Index Body Surface Area  
 34.35 kg/m 2 1.93 m 2 Preferred Pharmacy Pharmacy Name Phone [address] [phone] Your Updated Medication List  
  
   
This list is accurate as of 18  4:26 PM.  Always use your most recent med list.  
  
  
  
  
 * albuterol 90 mcg/actuation inhaler Commonly known as:  PROVENTIL HFA, VENTOLIN HFA, PROAIR HFA Take 2 Puffs by inhalation every four (4) hours as needed for Wheezing or Shortness of Breath. * albuterol 90 mcg/actuation inhaler Commonly known as:  PROVENTIL HFA, VENTOLIN HFA, PROAIR HFA Take 1 Puff by inhalation every six (6) hours as needed for Wheezing. chlorpheniramine-HYDROcodone 10-8 mg/5 mL suspension Commonly known as:  Lucianne Peasant ER Take 5 mL by mouth every twelve (12) hours as needed for Cough. Max Daily Amount: 10 mL.  
  
 escitalopram oxalate 20 mg tablet Commonly known as:  Eugena John Take 1 Tab by mouth daily. TAKE ONE TABLET BY MOUTH ONE TIME DAILY  
  
 fluticasone 50 mcg/actuation nasal spray Commonly known as:  Franco Gut 2 Sprays by Both Nostrils route daily. ibuprofen 800 mg tablet Commonly known as:  MOTRIN  
TAKE 1 TABLET BY MOUTH 3 TIMES A DAY AS NEEDED FOR PAIN  
  
 montelukast 10 mg tablet Commonly known as:  SINGULAIR Take 1 Tab by mouth daily. * Notice: This list has 2 medication(s) that are the same as other medications prescribed for you. Read the directions carefully, and ask your doctor or other care provider to review them with you. Prescriptions Sent to Pharmacy Refills  
 escitalopram oxalate (LEXAPRO) 20 mg tablet 1 Sig: Take 1 Tab by mouth daily. TAKE ONE TABLET BY MOUTH ONE TIME DAILY Class: Normal  
 Pharmacy: [address] #: [phone] Route: Oral  
  
Follow-up Instructions Return in about 6 months (around 3/19/2019). Patient Instructions Recovering From Depression: Care Instructions Your Care Instructions Taking good care of yourself is important as you recover from depression. In time, your symptoms will fade as your treatment takes hold. Do not give up. Instead, focus your energy on getting better. Your mood will improve. It just takes some time. Focus on things that can help you feel better, such as being with friends and family, eating well, and getting enough rest. But take things slowly.  Do not do too much too soon. You will begin to feel better gradually. Follow-up care is a key part of your treatment and safety. Be sure to make and go to all appointments, and call your doctor if you are having problems. It's also a good idea to know your test results and keep a list of the medicines you take. How can you care for yourself at home? Be realistic · If you have a large task to do, break it up into smaller steps you can handle, and just do what you can. · You may want to put off important decisions until your depression has lifted. If you have plans that will have a major impact on your life, such as marriage, divorce, or a job change, try to wait a bit. Talk it over with friends and loved ones who can help you look at the overall picture first. 
· Reaching out to people for help is important. Do not isolate yourself. Let your family and friends help you. Find someone you can trust and confide in, and talk to that person. · Be patient, and be kind to yourself. Remember that depression is not your fault and is not something you can overcome with willpower alone. Treatment is necessary for depression, just like for any other illness. Feeling better takes time, and your mood will improve little by little. Stay active · Stay busy and get outside. Take a walk, or try some other light exercise. · Talk with your doctor about an exercise program. Exercise can help with mild depression. · Go to a movie or concert. Take part in a Holiness activity or other social gathering. Go to a ball game. · Ask a friend to have dinner with you. Take care of yourself · Eat a balanced diet with plenty of fresh fruits and vegetables, whole grains, and lean protein. If you have lost your appetite, eat small snacks rather than large meals. · Avoid drinking alcohol or using illegal drugs. Do not take medicines that have not been prescribed for you.  They may interfere with medicines you may be taking for depression, or they may make your depression worse. · Take your medicines exactly as they are prescribed. You may start to feel better within 1 to 3 weeks of taking antidepressant medicine. But it can take as many as 6 to 8 weeks to see more improvement. If you have questions or concerns about your medicines, or if you do not notice any improvement by 3 weeks, talk to your doctor. · If you have any side effects from your medicine, tell your doctor. Antidepressants can make you feel tired, dizzy, or nervous. Some people have dry mouth, constipation, headaches, sexual problems, or diarrhea. Many of these side effects are mild and will go away on their own after you have been taking the medicine for a few weeks. Some may last longer. Talk to your doctor if side effects are bothering you too much. You might be able to try a different medicine. · Get enough sleep. If you have problems sleeping: ¨ Go to bed at the same time every night, and get up at the same time every morning. ¨ Keep your bedroom dark and quiet. ¨ Do not exercise after 5:00 p.m. ¨ Avoid drinks with caffeine after 5:00 p.m. · Avoid sleeping pills unless they are prescribed by the doctor treating your depression. Sleeping pills may make you groggy during the day, and they may interact with other medicine you are taking. · If you have any other illnesses, such as diabetes, heart disease, or high blood pressure, make sure to continue with your treatment. Tell your doctor about all of the medicines you take, including those with or without a prescription. · Keep the numbers for these national suicide hotlines: 4-675-671-TALK (4-118.140.7789) and 0-671-SZKPKRO (2-344.934.7979). If you or someone you know talks about suicide or feeling hopeless, get help right away. When should you call for help? Call 911 anytime you think you may need emergency care. For example, call if: 
  · You feel like hurting yourself or someone else.   · Someone you know has depression and is about to attempt or is attempting suicide.  
 Call your doctor now or seek immediate medical care if: 
  · You hear voices.  
  · Someone you know has depression and: 
¨ Starts to give away his or her possessions. ¨ Uses illegal drugs or drinks alcohol heavily. ¨ Talks or writes about death, including writing suicide notes or talking about guns, knives, or pills. ¨ Starts to spend a lot of time alone. ¨ Acts very aggressively or suddenly appears calm.  
 Watch closely for changes in your health, and be sure to contact your doctor if: 
  · You do not get better as expected. Where can you learn more? Go to http://lynn-palma.info/. Enter R856 in the search box to learn more about \"Recovering From Depression: Care Instructions. \" Current as of: December 7, 2017 Content Version: 11.7 © 5406-2977 [employer]. Care instructions adapted under license by [employer] (which disclaims liability or warranty for this information). If you have questions about a medical condition or this instruction, always ask your healthcare professional. [address] any warranty or liability for your use of this information. Introducing [facility] & HEALTH SERVICES! Lizzie Terrazas introduces [employer] patient portal. Now you can access parts of your medical record, email your doctor's office, and request medication refills online. 1. In your internet browser, go to [email or web address] 2. Click on the First Time User? Click Here link in the Sign In box. You will see the New Member Sign Up page. 3. Enter your Inv[employer] Access Code exactly as it appears below. You will not need to use this code after youve completed the sign-up process. If you do not sign up before the expiration date, you must request a new code. · [employer] Access Code: [email or web address] Expires: 12/18/2018  4:26 PM 
 
 4. Enter the last four digits of your Social Security Number (xxxx) and Date of Birth (mm/dd/yyyy) as indicated and click Submit. You will be taken to the next sign-up page. 5. Create a Everyclick ID. This will be your Everyclick login ID and cannot be changed, so think of one that is secure and easy to remember. 6. Create a Everyclick password. You can change your password at any time. 7. Enter your Password Reset Question and Answer. This can be used at a later time if you forget your password. 8. Enter your e-mail address. You will receive e-mail notification when new information is available in [address] 9. Click Sign Up. You can now view and download portions of your medical record. 10. Click the Download Summary menu link to download a portable copy of your medical information. If you have questions, please visit the Frequently Asked Questions section of the Everyclick website. Remember, Everyclick is NOT to be used for urgent needs. For medical emergencies, dial 911. Now available from your iPhone and Android! Please provide this summary of care documentation to your next provider. Your primary care clinician is listed as Opal Paz. If you have any questions after today's visit, please call [phone].

## 2018-09-19 NOTE — PROGRESS NOTES
Pt is here for f/u on depression. Pt scored a 6 on the depression scale    1. Have you been to the ER, urgent care clinic since your last visit? Hospitalized since your last visit? No    2. Have you seen or consulted any other health care providers outside of the [address] [place] [address] since your last visit? Include any pap smears or colon screening.  No

## 2018-09-29 RX ORDER — FLUTICASONE PROPIONATE 50 MCG
2 SPRAY, SUSPENSION (ML) NASAL DAILY
Qty: 3 BOTTLE | Refills: 0 | Status: SHIPPED | OUTPATIENT
Start: 2018-09-29 | End: 2019-11-26

## 2018-10-10 ENCOUNTER — OFFICE VISIT (OUTPATIENT)
Dept: FAMILY MEDICINE CLINIC | Age: 38
End: 2018-10-10

## 2018-10-10 ENCOUNTER — HOSPITAL ENCOUNTER (OUTPATIENT)
Dept: LAB | Age: 38
Discharge: HOME | End: 2018-10-10
Payer: COMMERCIAL

## 2018-10-10 VITALS
WEIGHT: 192 LBS | RESPIRATION RATE: 16 BRPM | BODY MASS INDEX: 35.33 KG/M2 | OXYGEN SATURATION: 95 % | DIASTOLIC BLOOD PRESSURE: 72 MMHG | TEMPERATURE: 99 F | HEART RATE: 83 BPM | SYSTOLIC BLOOD PRESSURE: 106 MMHG | HEIGHT: 62 IN

## 2018-10-10 DIAGNOSIS — R94.6 ABNORMAL THYROID FUNCTION TEST: ICD-10-CM

## 2018-10-10 DIAGNOSIS — R94.6 ABNORMAL THYROID FUNCTION TEST: Primary | ICD-10-CM

## 2018-10-10 PROCEDURE — 84439 ASSAY OF FREE THYROXINE: CPT | Performed by: FAMILY MEDICINE

## 2018-10-10 PROCEDURE — 36415 COLL VENOUS BLD VENIPUNCTURE: CPT | Performed by: FAMILY MEDICINE

## 2018-10-10 PROCEDURE — 84443 ASSAY THYROID STIM HORMONE: CPT | Performed by: FAMILY MEDICINE

## 2018-10-10 RX ORDER — GLYCOPYRROLATE 1 MG/1
1 TABLET ORAL DAILY
COMMUNITY
End: 2021-01-14 | Stop reason: ALTCHOICE

## 2018-10-10 NOTE — PROGRESS NOTES
HISTORY OF PRESENT ILLNESS  Donato Yost is a 43 y.o. female. HPI  Patient is here today for evaluation and treatment of: Thyroid    Thyroid: States that a recent thyroid test from [facility] showed a low thyroid. She has noted sx of Sweatiness, Weight gain and Difficulty losing weight     she is Scheduled for hysterectomy. She has had Recent uterine ablation    She was on thyroid meds some years ago; On \" thrive\" dietary supplement      Current Outpatient Prescriptions:     glycopyrrolate (ROBINUL) 1 mg tablet, Take 3 mg by mouth two (2) times a day., Disp: , Rfl:     fluticasone (FLONASE) 50 mcg/actuation nasal spray, 2 Sprays by Both Nostrils route daily. , Disp: 3 Bottle, Rfl: 0    escitalopram oxalate (LEXAPRO) 20 mg tablet, Take 1 Tab by mouth daily. TAKE ONE TABLET BY MOUTH ONE TIME DAILY, Disp: 90 Tab, Rfl: 1    montelukast (SINGULAIR) 10 mg tablet, Take 1 Tab by mouth daily. , Disp: 30 Tab, Rfl: 3    albuterol (PROVENTIL HFA, VENTOLIN HFA, PROAIR HFA) 90 mcg/actuation inhaler, Take 2 Puffs by inhalation every four (4) hours as needed for Wheezing or Shortness of Breath., Disp: 1 Inhaler, Rfl: 0     PMH,  Meds, Allergies, Family History, Social history reviewed    Review of Systems   Constitutional: Negative for chills and fever. Cardiovascular: Negative for chest pain and leg swelling. Physical Exam   Constitutional: She appears well-developed and well-nourished. No distress. Cardiovascular: Normal rate and regular rhythm. Exam reveals no gallop and no friction rub. No murmur heard. Pulmonary/Chest: Breath sounds normal. No respiratory distress. She has no wheezes. She has no rales. Musculoskeletal: She exhibits no edema. Nursing note and vitals reviewed. ASSESSMENT and PLAN    ICD-10-CM ICD-9-CM    1.  Abnormal thyroid function test R94.6 794.5 TSH 3RD GENERATION      T4, FREE       As above,new,  recheck thyroid labs  Follow-up Disposition:  Return if symptoms worsen or fail to improve. An After Visit Summary was printed and given to the patient. This has been fully explained to the patient, who indicates understanding.

## 2018-10-10 NOTE — MR AVS SNAPSHOT
[address] [address] [address] [address] 
[phone] Patient: Brenda Nicholson MRN: [id number] :1980 Visit Information Date & Time Provider Department Dept. Phone Encounter #  
 10/10/2018  3:00 PM Yvette De La Cruz, [address] [place] [address] [phone] [id number] Follow-up Instructions Return if symptoms worsen or fail to improve. Upcoming Health Maintenance Date Due  
 PAP AKA CERVICAL CYTOLOGY 3/26/2018 Influenza Age 5 to Adult 2018 DTaP/Tdap/Td series (2 - Td) 2024 Allergies as of 10/10/2018  Review Complete On: 10/10/2018 By: Yvette De La Cruz MD  
 No Known Allergies Current Immunizations  Never Reviewed Name Date Influenza Vaccine 10/29/2016 Tdap 2014 12:00 AM  
  
 Not reviewed this visit You Were Diagnosed With   
  
 Codes Comments Abnormal thyroid function test    -  Primary ICD-10-CM: R94.6 ICD-9-CM: 794.5 Vitals BP Pulse Temp Resp Height(growth percentile) Weight(growth percentile) 106/72 (BP 1 Location: Left arm, BP Patient Position: Sitting) 83 99 °F (37.2 °C) (Oral) 16 5' 2\" (1.575 m) 192 lb (87.1 kg) LMP SpO2 BMI OB Status Smoking Status 10/01/2018 (Exact Date) 95% 35.12 kg/m2 Having regular periods Never Smoker BMI and BSA Data Body Mass Index Body Surface Area  
 35.12 kg/m 2 1.95 m 2 Preferred Pharmacy Pharmacy Name Phone [address] [phone] Your Updated Medication List  
  
   
This list is accurate as of 10/10/18  4:14 PM.  Always use your most recent med list.  
  
  
  
  
 albuterol 90 mcg/actuation inhaler Commonly known as:  PROVENTIL HFA, VENTOLIN HFA, PROAIR HFA Take 2 Puffs by inhalation every four (4) hours as needed for Wheezing or Shortness of Breath.  
  
 escitalopram oxalate 20 mg tablet Commonly known as:  Jm Banegas Take 1 Tab by mouth daily. TAKE ONE TABLET BY MOUTH ONE TIME DAILY  
  
 fluticasone 50 mcg/actuation nasal spray Commonly known as:  Cha Plater 2 Sprays by Both Nostrils route daily. montelukast 10 mg tablet Commonly known as:  SINGULAIR Take 1 Tab by mouth daily. ROBINUL 1 mg tablet Generic drug:  glycopyrrolate Take 3 mg by mouth two (2) times a day. Follow-up Instructions Return if symptoms worsen or fail to improve. To-Do List   
 10/10/2018 Lab:  T4, FREE   
  
 10/10/2018 Lab:  TSH 3RD GENERATION Patient Instructions Thyroid Hormone Tests: About This Test 
What is it? Thyroid hormone tests are blood tests that check how well your thyroid gland is working. The thyroid gland is a butterfly-shaped gland that lies in front of your windpipe (trachea), just below your voice box (larynx). The thyroid gland makes hormones that control the way your body uses energy (metabolism). This test will give your doctor information about your thyroid hormone levels. You may have hyperthyroidism when your thyroid makes too much thyroid hormone. You may have hypothyroidism when your body does not make enough thyroid hormone. Why is this test done? Thyroid hormone tests are done to: · Find the cause of an abnormal thyroid-stimulating hormone (TSH) test, which is a blood test that checks for thyroid gland problems. · Check how well treatment for thyroid disease is working. · Test a  to find out if his or her thyroid gland is working as it should. How can you prepare for the test? 
Talk to your doctor about all your health conditions before the test. For example, tell your doctor about all medicines you take. If you are taking thyroid medicines, tell your doctor when you took your last dose.  You may need to stop taking thyroid medicines for a short time before having this test. 
What happens during the test? 
Blood test 
 A health professional will take a sample of your blood. Heel stick A 's heel is pricked with a sharp, short needle (lancet). Then, several drops of blood are collected for the test. Your child may have a tiny bruise where his or her heel was pricked. What happens after the test? 
You can go back to your usual activities right away. When should you call for help? Watch closely for changes in your health, and be sure to contact your doctor if you have any questions about this test. 
Follow-up care is a key part of your treatment and safety. Be sure to make and go to all appointments, and call your doctor if you are having problems. It's also a good idea to keep a list of the medicines you take. Ask your doctor when you can expect to have your test results. Where can you learn more? Go to http://lynn-palma.info/. Enter H865 in the search box to learn more about \"Thyroid Hormone Tests: About This Test.\" Current as of: March 15, 2018 Content Version: 11.8 © 7396-5602 [employer]B. Care instructions adapted under license by [employer] (which disclaims liability or warranty for this information). If you have questions about a medical condition or this instruction, always ask your healthcare professional. [address] any warranty or liability for your use of this information. Introducing [facility] & HEALTH SERVICES! [facility] introduces SnapShop patient portal. Now you can access parts of your medical record, email your doctor's office, and request medication refills online. 1. In your internet browser, go to [email or web address] 2. Click on the First Time User? Click Here link in the Sign In box. You will see the New Member Sign Up page. 3. Enter your SnapShop Access Code exactly as it appears below. You will not need to use this code after youve completed the sign-up process.  If you do not sign up before the expiration date, you must request a new code. · Pay-Me Access Code: [email or web address] Expires: 12/18/2018  4:26 PM 
 
4. Enter the last four digits of your Social Security Number (xxxx) and Date of Birth (mm/dd/yyyy) as indicated and click Submit. You will be taken to the next sign-up page. 5. Create a Pay-Me ID. This will be your Pay-Me login ID and cannot be changed, so think of one that is secure and easy to remember. 6. Create a Pay-Me password. You can change your password at any time. 7. Enter your Password Reset Question and Answer. This can be used at a later time if you forget your password. 8. Enter your e-mail address. You will receive e-mail notification when new information is available in [address] 9. Click Sign Up. You can now view and download portions of your medical record. 10. Click the Download Summary menu link to download a portable copy of your medical information. If you have questions, please visit the Frequently Asked Questions section of the Pay-Me website. Remember, Pay-Me is NOT to be used for urgent needs. For medical emergencies, dial 911. Now available from your iPhone and Android! Please provide this summary of care documentation to your next provider. Your primary care clinician is listed as Maris Graham. If you have any questions after today's visit, please call [phone].

## 2018-10-10 NOTE — PATIENT INSTRUCTIONS
Thyroid Hormone Tests: About This Test  What is it? Thyroid hormone tests are blood tests that check how well your thyroid gland is working. The thyroid gland is a butterfly-shaped gland that lies in front of your windpipe (trachea), just below your voice box (larynx). The thyroid gland makes hormones that control the way your body uses energy (metabolism). This test will give your doctor information about your thyroid hormone levels. You may have hyperthyroidism when your thyroid makes too much thyroid hormone. You may have hypothyroidism when your body does not make enough thyroid hormone. Why is this test done? Thyroid hormone tests are done to:  · Find the cause of an abnormal thyroid-stimulating hormone (TSH) test, which is a blood test that checks for thyroid gland problems. · Check how well treatment for thyroid disease is working. · Test a  to find out if his or her thyroid gland is working as it should. How can you prepare for the test?  Talk to your doctor about all your health conditions before the test. For example, tell your doctor about all medicines you take. If you are taking thyroid medicines, tell your doctor when you took your last dose. You may need to stop taking thyroid medicines for a short time before having this test.  What happens during the test?  Blood test  A health professional will take a sample of your blood. Heel stick  A 's heel is pricked with a sharp, short needle (lancet). Then, several drops of blood are collected for the test. Your child may have a tiny bruise where his or her heel was pricked. What happens after the test?  You can go back to your usual activities right away. When should you call for help? Watch closely for changes in your health, and be sure to contact your doctor if you have any questions about this test.  Follow-up care is a key part of your treatment and safety.  Be sure to make and go to all appointments, and call your doctor if you are having problems. It's also a good idea to keep a list of the medicines you take. Ask your doctor when you can expect to have your test results. Where can you learn more? Go to http://lynn-palma.info/. Enter H865 in the search box to learn more about \"Thyroid Hormone Tests: About This Test.\"  Current as of: March 15, 2018  Content Version: 11.8  © 1363-2586 Healthwise, Incorporated. Care instructions adapted under license by [employer] (which disclaims liability or warranty for this information). If you have questions about a medical condition or this instruction, always ask your healthcare professional. [address] any warranty or liability for your use of this information.

## 2018-10-10 NOTE — PROGRESS NOTES
1. Have you been to the ER, urgent care clinic since your last visit? Hospitalized since your last visit? No    2. Have you seen or consulted any other health care providers outside of the [address] [place] [address] since your last visit? Include any pap smears or colon screening.  Yes Where: Guero Marlow MD -GYN

## 2018-10-11 LAB
T4 FREE SERPL-MCNC: 0.8 NG/DL (ref 0.7–1.5)
TSH SERPL DL<=0.05 MIU/L-ACNC: 4 UIU/ML (ref 0.36–3.74)

## 2018-10-15 DIAGNOSIS — R79.89 ABNORMAL TSH: Primary | ICD-10-CM

## 2018-10-15 NOTE — PROGRESS NOTES
[place] [id number] Effie Moser to refer to endocrine Dr. Chana Trimble for abnormal TSH

## 2019-04-24 DIAGNOSIS — F43.22 ADJUSTMENT DISORDER WITH ANXIOUS MOOD: ICD-10-CM

## 2019-04-24 NOTE — TELEPHONE ENCOUNTER
Requested Prescriptions     Pending Prescriptions Disp Refills    escitalopram oxalate (LEXAPRO) 20 mg tablet 90 Tab 0

## 2019-04-26 RX ORDER — ESCITALOPRAM OXALATE 20 MG/1
TABLET ORAL
Qty: 14 TAB | Refills: 0 | Status: SHIPPED | OUTPATIENT
Start: 2019-04-26 | End: 2019-05-03 | Stop reason: SDUPTHER

## 2019-04-26 NOTE — TELEPHONE ENCOUNTER
Pt made an appt for may 3 want to know can she get a spacer until she comes in please advise [phone]

## 2019-05-03 ENCOUNTER — OFFICE VISIT (OUTPATIENT)
Dept: FAMILY MEDICINE CLINIC | Age: 39
End: 2019-05-03

## 2019-05-03 VITALS
SYSTOLIC BLOOD PRESSURE: 113 MMHG | HEIGHT: 62 IN | TEMPERATURE: 98.2 F | HEART RATE: 80 BPM | DIASTOLIC BLOOD PRESSURE: 71 MMHG | RESPIRATION RATE: 17 BRPM | WEIGHT: 183 LBS | OXYGEN SATURATION: 98 % | BODY MASS INDEX: 33.68 KG/M2

## 2019-05-03 DIAGNOSIS — F43.22 ADJUSTMENT DISORDER WITH ANXIOUS MOOD: ICD-10-CM

## 2019-05-03 RX ORDER — DOXEPIN HYDROCHLORIDE 10 MG/1
CAPSULE ORAL
Refills: 0 | COMMUNITY
Start: 2019-04-22 | End: 2021-01-14 | Stop reason: ALTCHOICE

## 2019-05-03 RX ORDER — ESCITALOPRAM OXALATE 20 MG/1
TABLET ORAL
Qty: 90 TAB | Refills: 1 | Status: SHIPPED | OUTPATIENT
Start: 2019-05-03 | End: 2019-10-24 | Stop reason: SDUPTHER

## 2019-05-03 RX ORDER — ESTRADIOL 0.05 MG/D
FILM, EXTENDED RELEASE TRANSDERMAL
Refills: 3 | COMMUNITY
Start: 2019-04-23 | End: 2019-11-26

## 2019-05-03 NOTE — PROGRESS NOTES
HISTORY OF PRESENT ILLNESS  Courtney Woods is a 35 y.o. female. Patient presents for med check. HPI  Pt needs refill on her Lexapro which is working well for her. Pt saw endocrine and they were trying to figure out why she couldn't lose weight, all the labs were normal.    Review of Systems   Constitutional: Negative. HENT: Negative. Eyes: Negative. Respiratory: Negative. Cardiovascular: Negative. Psychiatric/Behavioral: The patient is nervous/anxious. Visit Vitals  /71 (BP 1 Location: Left arm, BP Patient Position: Sitting)   Pulse 80   Temp 98.2 °F (36.8 °C) (Oral)   Resp 17   Ht 5' 2\" (1.575 m)   Wt 183 lb (83 kg)   LMP 12/19/2018   SpO2 98%   BMI 33.47 kg/m²     Physical Exam   Constitutional: She is oriented to person, place, and time. She appears well-developed. No distress. Cardiovascular: Normal rate, regular rhythm and normal heart sounds. No murmur heard. Pulmonary/Chest: Effort normal and breath sounds normal. No respiratory distress. She has no wheezes. She has no rales. Neurological: She is alert and oriented to person, place, and time. Psychiatric: She has a normal mood and affect. Her behavior is normal. Judgment and thought content normal.     ASSESSMENT and PLAN    ICD-10-CM ICD-9-CM    1. Adjustment disorder with anxious mood F43.22 309.24 escitalopram oxalate (LEXAPRO) 20 mg tablet     PLAN  We discussed diet and exercise. I explained to patient that as we age, we have to eat less and work out more, not just a couple times a week. but severn days a week and portion control. I have discussed the diagnosis with the patient and the intended plan as seen in the above orders. The patient has received an after-visit summary and questions were answered concerning future plans. I have discussed medication side effects and warnings with the patient as well. Patient will call for further questions.     Follow-up and Dispositions    · Return in about 6 months (around 11/3/2019) for chronic care.

## 2019-05-03 NOTE — PROGRESS NOTES
Chief Complaint   Patient presents with    Follow-up     medication check      1. Have you been to the ER, urgent care clinic since your last visit? Hospitalized since your last visit? Hysterectomy  December 2018       2. Have you seen or consulted any other health care providers outside of the [address] [place] [address] since your last visit? Include any pap smears or colon screening.  No

## 2019-11-05 ENCOUNTER — OFFICE VISIT (OUTPATIENT)
Dept: FAMILY MEDICINE CLINIC | Age: 39
End: 2019-11-05

## 2019-11-05 VITALS
TEMPERATURE: 98.2 F | BODY MASS INDEX: 35.51 KG/M2 | RESPIRATION RATE: 16 BRPM | OXYGEN SATURATION: 98 % | SYSTOLIC BLOOD PRESSURE: 139 MMHG | HEART RATE: 76 BPM | DIASTOLIC BLOOD PRESSURE: 65 MMHG | HEIGHT: 62 IN | WEIGHT: 193 LBS

## 2019-11-05 DIAGNOSIS — L08.9 LOCAL SKIN INFECTION: Primary | ICD-10-CM

## 2019-11-05 RX ORDER — CEPHALEXIN 500 MG/1
500 CAPSULE ORAL 2 TIMES DAILY
Qty: 14 CAP | Refills: 0 | Status: SHIPPED | OUTPATIENT
Start: 2019-11-05 | End: 2019-11-12

## 2019-11-05 RX ORDER — PREDNISOLONE ACETATE 10 MG/ML
SUSPENSION/ DROPS OPHTHALMIC
COMMUNITY
Start: 2019-11-05

## 2019-11-05 RX ORDER — LEVOTHYROXINE, LIOTHYRONINE 9.5; 2.25 UG/1; UG/1
30 TABLET ORAL
Refills: 2 | COMMUNITY
Start: 2019-08-27 | End: 2021-01-14 | Stop reason: ALTCHOICE

## 2019-11-05 NOTE — PROGRESS NOTES
HISTORY OF PRESENT ILLNESS  Angelina Mcdowell is a 35 y.o. female. Patient states for the last 6-8 weeks she had a small area to her right lower posterior leg. States she thought it was an in grown hair. Has tried essential oils, bursting it with a needle, shaving over area without any ipmrovement. States area seems to have gotten hard. Denies drainage. No Known Allergies  Current Outpatient Medications   Medication Sig Dispense Refill    escitalopram oxalate (LEXAPRO) 20 mg tablet TAKE 1 TABLET BY MOUTH EVERY DAY 90 Tab 0    doxepin (SINEQUAN) 10 mg capsule TAKE 1 TO 2 CAPSULES BY MOUTH AT BEDTIME  0    estradiol (VIVELLE) 0.05 mg/24 hr APPLY 2 TIMES PER WEEK AS DIRECTED  3    glycopyrrolate (ROBINUL) 1 mg tablet Take 3 mg by mouth daily.  albuterol (PROVENTIL HFA, VENTOLIN HFA, PROAIR HFA) 90 mcg/actuation inhaler Take 2 Puffs by inhalation every four (4) hours as needed for Wheezing or Shortness of Breath. 1 Inhaler 0    prednisoLONE acetate (PRED FORTE) 1 % ophthalmic suspension       NP THYROID 15 mg tablet TAKE 1 TABLET BY MOUTH EVERY DAY  2    fluticasone (FLONASE) 50 mcg/actuation nasal spray 2 Sprays by Both Nostrils route daily. 3 Bottle 0    montelukast (SINGULAIR) 10 mg tablet Take 1 Tab by mouth daily.  30 Tab 3     Past Medical History:   Diagnosis Date    Depression     Seasonal allergic rhinitis     Thyroid disease      Social History     Socioeconomic History    Marital status:      Spouse name: Not on file    Number of children: Not on file    Years of education: Not on file    Highest education level: Not on file   Occupational History    Not on file   Social Needs    Financial resource strain: Not on file    Food insecurity:     Worry: Not on file     Inability: Not on file    Transportation needs:     Medical: Not on file     Non-medical: Not on file   Tobacco Use    Smoking status: Never Smoker    Smokeless tobacco: Never Used   Substance and Sexual Activity  Alcohol use: Yes     Comment: 1-2 weekly    Drug use: No    Sexual activity: Yes     Partners: Male   Lifestyle    Physical activity:     Days per week: Not on file     Minutes per session: Not on file    Stress: Not on file   Relationships    Social connections:     Talks on phone: Not on file     Gets together: Not on file     Attends Buddhist service: Not on file     Active member of club or organization: Not on file     Attends meetings of clubs or organizations: Not on file     Relationship status: Not on file    Intimate partner violence:     Fear of current or ex partner: Not on file     Emotionally abused: Not on file     Physically abused: Not on file     Forced sexual activity: Not on file   Other Topics Concern    Not on file   Social History Narrative    Not on file     Wt Readings from Last 3 Encounters:   11/05/19 193 lb (87.5 kg)   05/03/19 183 lb (83 kg)   10/10/18 192 lb (87.1 kg)     BP Readings from Last 3 Encounters:   11/05/19 139/65   05/03/19 113/71   10/10/18 106/72     Review of Systems   Skin:        Right lower posterior leg wound      /65   Pulse 76   Temp 98.2 °F (36.8 °C) (Oral)   Resp 16   Ht 5' 2\" (1.575 m)   Wt 193 lb (87.5 kg)   LMP 12/19/2018   SpO2 98%   BMI 35.30 kg/m²      Physical Exam   Constitutional: She is oriented to person, place, and time. She appears well-developed and well-nourished. HENT:   Head: Normocephalic and atraumatic. Neck: Normal range of motion. Neck supple. Cardiovascular: Normal rate, regular rhythm and normal heart sounds. Exam reveals no gallop and no friction rub. No murmur heard. Pulmonary/Chest: Effort normal and breath sounds normal. She has no wheezes. She has no rhonchi. She has no rales. Neurological: She is alert and oriented to person, place, and time. Skin: Skin is warm and dry.    Right lower posterior leg papule without drainage, mild surrounding erythema, no warmth       ASSESSMENT and PLAN    ICD-10-CM ICD-9-CM    1. Local skin infection L08.9 686.9      Orders Placed This Encounter    prednisoLONE acetate (PRED FORTE) 1 % ophthalmic suspension    NP THYROID 15 mg tablet    cephALEXin (KEFLEX) 500 mg capsule         I have discussed the diagnosis with the patient and the intended plan as seen in the above orders. The patient has received an after-visit summary and questions were answered concerning future plans. I have discussed medication side effects and warnings with the patient as well. Patient agreeable with above plan and verbalizes understanding. Follow-up and Dispositions    · Return in about 1 week (around 11/12/2019) for leg wound.

## 2019-11-05 NOTE — PROGRESS NOTES
Angelina Mcdowell presents today for   Chief Complaint   Patient presents with    Skin Problem     x 7 week on back of leg       Is someone accompanying this pt? no    Is the patient using any DME equipment during [address]? no    Depression Screening:  3 most recent PHQ Screens 9/19/2018   PHQ Not Done -   Little interest or pleasure in doing things Several days   Feeling down, depressed, irritable, or hopeless Several days   Total Score PHQ 2 2   Trouble falling or staying asleep, or sleeping too much Not at all   Feeling tired or having little energy Several days   Poor appetite, weight loss, or overeating More than half the days   Feeling bad about yourself - or that you are a failure or have let yourself or your family down Several days   Trouble concentrating on things such as school, work, reading, or watching TV Not at all   Moving or speaking so slowly that other people could have noticed; or the opposite being so fidgety that others notice Not at all   Thoughts of being better off dead, or hurting yourself in some way Not at all   PHQ 9 Score 6   How difficult have these problems made it for you to do your work, take care of your home and get along with others Somewhat difficult       Learning Assessment:  Learning Assessment 5/28/2015   PRIMARY LEARNER Patient   HIGHEST LEVEL OF EDUCATION - PRIMARY LEARNER  > 4 YEARS OF COLLEGE   BARRIERS PRIMARY LEARNER NONE   CO-LEARNER CAREGIVER No   PRIMARY LANGUAGE ENGLISH   LEARNER PREFERENCE PRIMARY DEMONSTRATION     READING     LISTENING   ANSWERED BY patient   RELATIONSHIP SELF       Abuse Screening:  Abuse Screening Questionnaire 5/28/2015   Do you ever feel afraid of your partner? N   Are you in a relationship with someone who physically or mentally threatens you? N   Is it safe for you to go home? Y       Fall Risk  No flowsheet data found. Health Maintenance reviewed and discussed and ordered per Provider.     Health Maintenance Due   Topic Date Due    PAP AKA CERVICAL CYTOLOGY  03/26/2018    Influenza Age 5 to Adult  08/01/2019           Coordination of Care:  1. Have you been to the ER, urgent care clinic since your last visit? Hospitalized since your last visit? no    2. Have you seen or consulted any other health care providers outside of the [address] [place] [address] since your last visit? Include any pap smears or colon screening.  no

## 2019-11-15 ENCOUNTER — OFFICE VISIT (OUTPATIENT)
Dept: FAMILY MEDICINE CLINIC | Age: 39
End: 2019-11-15

## 2019-11-15 VITALS
RESPIRATION RATE: 17 BRPM | HEIGHT: 62 IN | SYSTOLIC BLOOD PRESSURE: 120 MMHG | BODY MASS INDEX: 35.7 KG/M2 | HEART RATE: 86 BPM | OXYGEN SATURATION: 98 % | WEIGHT: 194 LBS | TEMPERATURE: 98.7 F | DIASTOLIC BLOOD PRESSURE: 72 MMHG

## 2019-11-15 DIAGNOSIS — L98.9 SKIN LESION OF RIGHT LEG: Primary | ICD-10-CM

## 2019-11-15 NOTE — PROGRESS NOTES
Chief Complaint   Patient presents with    Wound Check     right leg follow up     1. Have you been to the ER, urgent care clinic since your last visit? Hospitalized since your last visit? No    2. Have you seen or consulted any other health care providers outside of the [address] [place] [address] since your last visit? Include any pap smears or colon screening.  No

## 2019-11-15 NOTE — PROGRESS NOTES
Subjective:   Veronica Leonardo is a 35 y.o. female recheck of a skin infection on right lower posterior leg. Seen by another health care provider on 11-. Prescribed Keflex the lesion is still there but with dry flaky skin circumferential      Current Outpatient Medications   Medication Sig Dispense Refill    prednisoLONE acetate (PRED FORTE) 1 % ophthalmic suspension       NP THYROID 15 mg tablet TAKE 1 TABLET BY MOUTH EVERY DAY  2    escitalopram oxalate (LEXAPRO) 20 mg tablet TAKE 1 TABLET BY MOUTH EVERY DAY 90 Tab 0    doxepin (SINEQUAN) 10 mg capsule TAKE 1 TO 2 CAPSULES BY MOUTH AT BEDTIME  0    estradiol (VIVELLE) 0.05 mg/24 hr APPLY 2 TIMES PER WEEK AS DIRECTED  3    glycopyrrolate (ROBINUL) 1 mg tablet Take 3 mg by mouth daily.  fluticasone (FLONASE) 50 mcg/actuation nasal spray 2 Sprays by Both Nostrils route daily. 3 Bottle 0    albuterol (PROVENTIL HFA, VENTOLIN HFA, PROAIR HFA) 90 mcg/actuation inhaler Take 2 Puffs by inhalation every four (4) hours as needed for Wheezing or Shortness of Breath. 1 Inhaler 0    montelukast (SINGULAIR) 10 mg tablet Take 1 Tab by mouth daily. 30 Tab 3      Review of Systems   Constitutional: Negative for weight loss. Skin: Negative for itching and rash. OBJECTIVE:  Awake and alert in no acute distress  Lungs clear throughout  S1 S2 RRR without ectopy or murmur auscultated. Integumentary: location posterior thigh on right   Size 1 cm hyperkeratotic papule  Scaly appearance circumferential    Visit Vitals  /72 (BP 1 Location: Right arm, BP Patient Position: Sitting)   Pulse 86   Temp 98.7 °F (37.1 °C) (Oral)   Resp 17   Ht 5' 2\" (1.575 m)   Wt 194 lb (88 kg)   SpO2 98%   BMI 35.48 kg/m²     Diagnoses and all orders for this visit:    Skin lesion of right leg  -     REFERRAL TO DERMATOLOGY    patient will call for appointment   I have discussed the diagnosis with the patient and the intended plan as seen in the above orders.   The patient has received an after-visit summary and questions were answered concerning future plans. I have discussed medication side effects and warnings with the patient as well. Follow-up and Dispositions    · Return if symptoms worsen or fail to improve.

## 2019-11-26 ENCOUNTER — OFFICE VISIT (OUTPATIENT)
Dept: FAMILY MEDICINE CLINIC | Age: 39
End: 2019-11-26

## 2019-11-26 VITALS
TEMPERATURE: 98.1 F | RESPIRATION RATE: 16 BRPM | BODY MASS INDEX: 36.44 KG/M2 | SYSTOLIC BLOOD PRESSURE: 114 MMHG | WEIGHT: 198 LBS | HEART RATE: 88 BPM | HEIGHT: 62 IN | OXYGEN SATURATION: 97 % | DIASTOLIC BLOOD PRESSURE: 66 MMHG

## 2019-11-26 DIAGNOSIS — J20.9 ACUTE BRONCHITIS, UNSPECIFIED ORGANISM: ICD-10-CM

## 2019-11-26 RX ORDER — BENZONATATE 100 MG/1
100 CAPSULE ORAL
Qty: 20 CAP | Refills: 0 | Status: SHIPPED | OUTPATIENT
Start: 2019-11-26 | End: 2021-10-14 | Stop reason: ALTCHOICE

## 2019-11-26 RX ORDER — AMOXICILLIN AND CLAVULANATE POTASSIUM 875; 125 MG/1; MG/1
1 TABLET, FILM COATED ORAL EVERY 12 HOURS
Qty: 20 TAB | Refills: 0 | Status: SHIPPED | OUTPATIENT
Start: 2019-11-26 | End: 2019-12-06

## 2019-11-26 RX ORDER — ALBUTEROL SULFATE 90 UG/1
2 AEROSOL, METERED RESPIRATORY (INHALATION)
Qty: 1 INHALER | Refills: 0 | Status: SHIPPED | OUTPATIENT
Start: 2019-11-26 | End: 2021-10-14 | Stop reason: ALTCHOICE

## 2019-11-26 NOTE — PROGRESS NOTES
1. Have you been to the ER, urgent care clinic since your last visit? Hospitalized since your last visit? No    2. Have you seen or consulted any other health care providers outside of the [address] [place] [address] since your last visit? Include any pap smears or colon screening.  No

## 2019-11-26 NOTE — PROGRESS NOTES
HISTORY OF PRESENT ILLNESS  Rell Hamm is a 35 y.o. female. HPI  Patient is here today for evaluation and treatment of:  Cough    Cough: for the last week  she has had a persistent cough. Sx started in the nasal passages and progressed to ear popping. Cough is productive of thick green phlegm;  mucinex has not helped. Current Outpatient Medications:     prednisoLONE acetate (PRED FORTE) 1 % ophthalmic suspension, , Disp: , Rfl:     NP THYROID 15 mg tablet, 30 mg., Disp: , Rfl: 2    escitalopram oxalate (LEXAPRO) 20 mg tablet, TAKE 1 TABLET BY MOUTH EVERY DAY, Disp: 90 Tab, Rfl: 0    doxepin (SINEQUAN) 10 mg capsule, TAKE 1 TO 2 CAPSULES BY MOUTH AT BEDTIME, Disp: , Rfl: 0    glycopyrrolate (ROBINUL) 1 mg tablet, Take 1 mg by mouth daily. , Disp: , Rfl:     albuterol (PROVENTIL HFA, VENTOLIN HFA, PROAIR HFA) 90 mcg/actuation inhaler, Take 2 Puffs by inhalation every four (4) hours as needed for Wheezing or Shortness of Breath., Disp: 1 Inhaler, Rfl: 0    PMH,  Meds, Allergies, Family History, Social history reviewed    Review of Systems   Constitutional: Negative for chills and fever. Respiratory: Positive for cough and wheezing. Cardiovascular: Negative for chest pain. Physical Exam  Constitutional:       General: She is not in acute distress. Appearance: Normal appearance. She is not ill-appearing. HENT:      Right Ear: Tympanic membrane normal.      Left Ear: Tympanic membrane normal.      Mouth/Throat:      Mouth: Mucous membranes are moist.      Pharynx: No oropharyngeal exudate or posterior oropharyngeal erythema. Cardiovascular:      Rate and Rhythm: Normal rate and regular rhythm. Heart sounds: Normal heart sounds. No murmur. No gallop. Pulmonary:      Effort: No respiratory distress. Breath sounds: Normal breath sounds. No wheezing. Neurological:      Mental Status: She is alert.         Visit Vitals  /66 (BP 1 Location: Right arm, BP Patient Position: Sitting)   Pulse 88   Temp 98.1 °F (36.7 °C) (Oral)   Resp 16   Ht 5' 2\" (1.575 m)   Wt 198 lb (89.8 kg)   LMP 12/19/2018   SpO2 97%   BMI 36.21 kg/m²         ASSESSMENT and PLAN    ICD-10-CM ICD-9-CM    1. Acute bronchitis, unspecified organism J20.9 466.0 albuterol (PROVENTIL HFA, VENTOLIN HFA, PROAIR HFA) 90 mcg/actuation inhaler       As above, new   treatment plan as listed below  Orders Placed This Encounter    albuterol (PROVENTIL HFA, VENTOLIN HFA, PROAIR HFA) 90 mcg/actuation inhaler    amoxicillin-clavulanate (AUGMENTIN) 875-125 mg per tablet    benzonatate (TESSALON) 100 mg capsule     Follow-up and Dispositions    · Return if symptoms worsen or fail to improve. An After Visit Summary was printed and given to the patient. This has been fully explained to the patient, who indicates understanding.

## 2019-12-09 ENCOUNTER — TELEPHONE (OUTPATIENT)
Dept: FAMILY MEDICINE CLINIC | Age: 39
End: 2019-12-09

## 2019-12-09 NOTE — TELEPHONE ENCOUNTER
Patient called in states concerned still hs cough from couple weeks ago, wants to know if she should expect it to last a bit longer and get something for her cough or if she needs to make an appt to be seen   please adv pt [phone]

## 2019-12-10 NOTE — TELEPHONE ENCOUNTER
Returned call to patient, per nurse response attempted to schedule appt with provider, only available late Jan, pt declined. Spoke with another nurse and provider and offered appt for tomorrow at 2:00 with Santosh, patient declined and states will see how feels and visit an urgent care if needed.

## 2020-02-16 DIAGNOSIS — F43.22 ADJUSTMENT DISORDER WITH ANXIOUS MOOD: ICD-10-CM

## 2020-02-16 RX ORDER — ESCITALOPRAM OXALATE 20 MG/1
TABLET ORAL
Qty: 30 TAB | Refills: 0 | Status: SHIPPED | OUTPATIENT
Start: 2020-02-16 | End: 2020-09-23 | Stop reason: SDUPTHER

## 2020-05-18 ENCOUNTER — VIRTUAL VISIT (OUTPATIENT)
Dept: FAMILY MEDICINE CLINIC | Age: 40
End: 2020-05-18

## 2020-05-18 DIAGNOSIS — R21 RASH: Primary | ICD-10-CM

## 2020-05-18 RX ORDER — CLOTRIMAZOLE AND BETAMETHASONE DIPROPIONATE 10; .64 MG/G; MG/G
CREAM TOPICAL
Qty: 15 G | Refills: 0 | Status: SHIPPED | OUTPATIENT
Start: 2020-05-18 | End: 2020-05-28 | Stop reason: SDUPTHER

## 2020-05-18 RX ORDER — FLUCONAZOLE 200 MG/1
200 TABLET ORAL DAILY
Qty: 7 TAB | Refills: 0 | Status: SHIPPED | OUTPATIENT
Start: 2020-05-18 | End: 2020-05-25

## 2020-05-18 NOTE — PROGRESS NOTES
Eloisa Elizondo is a 38 y.o. female who was seen by synchronous (real-time) audio-video technology on 5/18/2020. Consent: Eloisa Elizondo, who was seen by synchronous (real-time) audio-video technology, and/or her healthcare decision maker, is aware that this patient-initiated, Telehealth encounter on 5/18/2020 is a billable service, with coverage as determined by her insurance carrier. She is aware that she may receive a bill and has provided verbal consent to proceed: Yes. I am working from home. Patient is at her home. Subjective:   Eloisa Elizondo is a 38 y.o. female who was seen for rash. Pt states she has to different areas of a rash. Her armpits for over 6 weeks. They are bumps, that hurt. The one on her right hip started after camping out in the yard this weekend. It really itches. Prior to Admission medications    Medication Sig Start Date End Date Taking? Authorizing Provider   escitalopram oxalate (LEXAPRO) 20 mg tablet TAKE 1 TABLET BY MOUTH EVERY DAY 2/16/20   Maxine Frost, ZAIRA   albuterol (PROVENTIL HFA, VENTOLIN HFA, PROAIR HFA) 90 mcg/actuation inhaler Take 2 Puffs by inhalation every four (4) hours as needed for Wheezing or Shortness of Breath. 11/26/19   Vinny Sebastian MD   benzonatate (TESSALON) 100 mg capsule Take 1 Cap by mouth three (3) times daily as needed for Cough. 11/26/19   Vinny Sebastian MD   prednisoLONE acetate (PRED FORTE) 1 % ophthalmic suspension  11/5/19   Provider, Historical   NP THYROID 15 mg tablet 30 mg. 8/27/19   Provider, Historical   doxepin (SINEQUAN) 10 mg capsule TAKE 1 TO 2 CAPSULES BY MOUTH AT BEDTIME 4/22/19   Provider, Historical   glycopyrrolate (ROBINUL) 1 mg tablet Take 1 mg by mouth daily.     Provider, Historical     No Known Allergies    Patient Active Problem List    Diagnosis Date Noted    Asthma     Recurrent depression ([facility] [address] 12/27/2017    Cough 12/04/2017    GERD (gastroesophageal reflux disease) 08/13/2013     Current Outpatient Medications   Medication Sig Dispense Refill    clotrimazole-betamethasone (LOTRISONE) topical cream Use small amount bid until clear to hip area. 15 g 0    fluconazole (DIFLUCAN) 200 mg tablet Take 1 Tab by mouth daily for 7 days. FDA advises cautious prescribing of oral fluconazole in pregnancy. 7 Tab 0    escitalopram oxalate (LEXAPRO) 20 mg tablet TAKE 1 TABLET BY MOUTH EVERY DAY 30 Tab 0    albuterol (PROVENTIL HFA, VENTOLIN HFA, PROAIR HFA) 90 mcg/actuation inhaler Take 2 Puffs by inhalation every four (4) hours as needed for Wheezing or Shortness of Breath. 1 Inhaler 0    benzonatate (TESSALON) 100 mg capsule Take 1 Cap by mouth three (3) times daily as needed for Cough. 20 Cap 0    prednisoLONE acetate (PRED FORTE) 1 % ophthalmic suspension       NP THYROID 15 mg tablet 30 mg.  2    doxepin (SINEQUAN) 10 mg capsule TAKE 1 TO 2 CAPSULES BY MOUTH AT BEDTIME  0    glycopyrrolate (ROBINUL) 1 mg tablet Take 1 mg by mouth daily.        No Known Allergies  Past Medical History:   Diagnosis Date    Asthma     Depression     Seasonal allergic rhinitis     Thyroid disease      Past Surgical History:   Procedure Laterality Date    HX  SECTION       Family History   Problem Relation Age of Onset    Cancer Paternal Grandfather     Hypertension Paternal Grandfather     Cancer Mother         thyr    Other Mother         hyperglycemicand fibromyalgi    Cancer Father     High Cholesterol Father     Hypertension Maternal Aunt     High Cholesterol Maternal Aunt     Hypertension Maternal Uncle     High Cholesterol Maternal Uncle     Diabetes Maternal Grandmother     Hypertension Maternal Grandmother     High Cholesterol Maternal Grandmother     Lung Disease Maternal Grandfather     Hypertension Maternal Grandfather      Social History     Tobacco Use    Smoking status: Never Smoker    Smokeless tobacco: Never Used   Substance Use Topics    Alcohol use: Yes     Comment: 1-2 weekly       Review of Systems   Constitutional: Negative. HENT: Negative. Respiratory: Negative. Cardiovascular: Negative. Skin: Positive for itching and rash. Objective:   Vital Signs: (As obtained by patient/caregiver at home)  Visit Vitals  LMP 12/19/2018        [INSTRUCTIONS:  \"[x]\" Indicates a positive item  \"[]\" Indicates a negative item  -- DELETE ALL ITEMS NOT EXAMINED]    Constitutional: [x] Appears well-developed and well-nourished [x] No apparent distress          Mental status: [x] Alert and awake  [x] Oriented to person/place/time [x] Able to follow commands        Eyes:   EOM    [x]  Normal      Sclera  [x]  Normal              Discharge [x]  None visible       HENT: [x] Normocephalic, atraumatic        Pulmonary/Chest: [x] Respiratory effort normal   [x] No visualized signs of difficulty breathing or respiratory distress      Neurological:        [x] No Facial Asymmetry (Cranial nerve 7 motor function) (limited exam due to video visit)             Skin:                [x] Abnormal - mahi axilla, there is a erythematous base with papules with no pustular centers. Right hip there is a rash with raised erythemaotus border made of papules. and a depressed center. Psychiatric:       [x] Normal Affect        Diagnoses and all orders for this visit:    Rash  -     clotrimazole-betamethasone (LOTRISONE) topical cream; Use small amount bid until clear to hip area., Normal, Disp-15 g, R-0  -     fluconazole (DIFLUCAN) 200 mg tablet; Take 1 Tab by mouth daily for 7 days. FDA advises cautious prescribing of oral fluconazole in pregnancy. , Normal, Disp-7 Tab, R-0      PLAN:  If the rash persist or worsens, consider permethacin for the hip area and possible antibiotic for the axilla. II have discussed the diagnosis with the patient and the intended plan as seen in the above orders. The patient has received an after-visit summary and questions were answered concerning future plans.   I have discussed medication side effects and warnings with the patient as well. Patient will call for further questions. Follow-up and Dispositions    · Return if symptoms worsen or fail to improve. We discussed the expected course, resolution and complications of the diagnosis(es) in detail. Medication risks, benefits, costs, interactions, and alternatives were discussed as indicated. I advised her to contact the office if her condition worsens, changes or fails to improve as anticipated. She expressed understanding with the diagnosis(es) and plan. Sarahy De is a 38 y.o. female who was evaluated by a video visit encounter for concerns as above. Patient identification was verified prior to start of the visit. A caregiver was present when appropriate. Due to this being a TeleHealth encounter ([facility]), evaluation of the following organ systems was limited: Vitals/Constitutional/EENT/Resp/CV/GI//MS/Neuro/Skin/Heme-Lymph-Imm. Pursuant to the emergency declaration under the [facility] waiver authority and the [employer] Act, this Virtual  Visit was conducted, with patient's (and/or legal guardian's) consent, to reduce the patient's risk of exposure to COVID-19 and provide necessary medical care. Services were provided through a video synchronous discussion virtually to substitute for in-person clinic visit. Patient and provider were located at their individual homes.       Renato Gaviria NP

## 2020-05-22 ENCOUNTER — TELEPHONE (OUTPATIENT)
Dept: FAMILY MEDICINE CLINIC | Age: 40
End: 2020-05-22

## 2020-05-28 DIAGNOSIS — R21 RASH: ICD-10-CM

## 2020-05-28 RX ORDER — CLOTRIMAZOLE AND BETAMETHASONE DIPROPIONATE 10; .64 MG/G; MG/G
CREAM TOPICAL
Qty: 15 G | Refills: 1 | Status: SHIPPED | OUTPATIENT
Start: 2020-05-28 | End: 2021-01-14 | Stop reason: ALTCHOICE

## 2020-05-28 RX ORDER — FLUCONAZOLE 200 MG/1
200 TABLET ORAL DAILY
Qty: 7 TAB | Refills: 0 | Status: SHIPPED | OUTPATIENT
Start: 2020-05-28 | End: 2020-06-04

## 2020-07-22 ENCOUNTER — VIRTUAL VISIT (OUTPATIENT)
Dept: FAMILY MEDICINE CLINIC | Age: 40
End: 2020-07-22

## 2020-07-22 DIAGNOSIS — H65.01 NON-RECURRENT ACUTE SEROUS OTITIS MEDIA OF RIGHT EAR: Primary | ICD-10-CM

## 2020-07-22 RX ORDER — CEFUROXIME AXETIL 500 MG/1
500 TABLET ORAL 2 TIMES DAILY
Qty: 20 TAB | Refills: 0 | Status: SHIPPED | OUTPATIENT
Start: 2020-07-22 | End: 2020-09-23 | Stop reason: SDUPTHER

## 2020-07-22 NOTE — PROGRESS NOTES
Geri Garcia is a 38 y.o. female who was seen by synchronous (real-time) audio-video technology on 7/22/2020 for right ear pain. I am working from home. Patient is at her home. Subjective: This started on Suday. She has been using her son's left over eardrops from ENT. Ear still feels full. She can not hear out of it. She denies congestion or fever. They has been swimming the week prior. Prior to Admission medications    Medication Sig Start Date End Date Taking? Authorizing Provider   clotrimazole-betamethasone (LOTRISONE) topical cream Use small amount bid until clear to hip area. 5/28/20   Gretel Frost NP   escitalopram oxalate (LEXAPRO) 20 mg tablet TAKE 1 TABLET BY MOUTH EVERY DAY 2/16/20   Gretel Frost NP   albuterol (PROVENTIL HFA, VENTOLIN HFA, PROAIR HFA) 90 mcg/actuation inhaler Take 2 Puffs by inhalation every four (4) hours as needed for Wheezing or Shortness of Breath. 11/26/19   Claude Shells, MD   benzonatate (TESSALON) 100 mg capsule Take 1 Cap by mouth three (3) times daily as needed for Cough. 11/26/19   Claude Shells, MD   prednisoLONE acetate (PRED FORTE) 1 % ophthalmic suspension  11/5/19   Provider, Historical   NP THYROID 15 mg tablet 30 mg. 8/27/19   Provider, Historical   doxepin (SINEQUAN) 10 mg capsule TAKE 1 TO 2 CAPSULES BY MOUTH AT BEDTIME 4/22/19   Provider, Historical   glycopyrrolate (ROBINUL) 1 mg tablet Take 1 mg by mouth daily. Provider, Historical     Patient Active Problem List    Diagnosis Date Noted    Asthma     Recurrent depression ([facility] [address] 12/27/2017    Cough 12/04/2017    GERD (gastroesophageal reflux disease) 08/13/2013     Current Outpatient Medications   Medication Sig Dispense Refill    cefUROXime (CEFTIN) 500 mg tablet Take 1 Tab by mouth two (2) times a day. 20 Tab 0    clotrimazole-betamethasone (LOTRISONE) topical cream Use small amount bid until clear to hip area.  15 g 1    escitalopram oxalate (LEXAPRO) 20 mg tablet TAKE 1 TABLET BY MOUTH EVERY DAY 30 Tab 0    albuterol (PROVENTIL HFA, VENTOLIN HFA, PROAIR HFA) 90 mcg/actuation inhaler Take 2 Puffs by inhalation every four (4) hours as needed for Wheezing or Shortness of Breath. 1 Inhaler 0    benzonatate (TESSALON) 100 mg capsule Take 1 Cap by mouth three (3) times daily as needed for Cough. 20 Cap 0    prednisoLONE acetate (PRED FORTE) 1 % ophthalmic suspension       NP THYROID 15 mg tablet 30 mg.  2    doxepin (SINEQUAN) 10 mg capsule TAKE 1 TO 2 CAPSULES BY MOUTH AT BEDTIME  0    glycopyrrolate (ROBINUL) 1 mg tablet Take 1 mg by mouth daily. No Known Allergies  Past Medical History:   Diagnosis Date    Asthma     Depression     Seasonal allergic rhinitis     Thyroid disease      Past Surgical History:   Procedure Laterality Date    HX  SECTION       Family History   Problem Relation Age of Onset    Cancer Paternal Grandfather     Hypertension Paternal Grandfather     Cancer Mother         thyr    Other Mother         hyperglycemicand fibromyalgi    Cancer Father     High Cholesterol Father     Hypertension Maternal Aunt     High Cholesterol Maternal Aunt     Hypertension Maternal Uncle     High Cholesterol Maternal Uncle     Diabetes Maternal Grandmother     Hypertension Maternal Grandmother     High Cholesterol Maternal Grandmother     Lung Disease Maternal Grandfather     Hypertension Maternal Grandfather      Social History     Tobacco Use    Smoking status: Never Smoker    Smokeless tobacco: Never Used   Substance Use Topics    Alcohol use: Yes     Comment: 1-2 weekly       Review of Systems   Constitutional: Negative. HENT: Positive for ear pain. Negative for congestion, ear discharge, sinus pain and sore throat. Respiratory: Negative. Cardiovascular: Negative. Objective:   No flowsheet data found.      [INSTRUCTIONS:  \"[x]\" Indicates a positive item  \"[]\" Indicates a negative item  -- DELETE ALL ITEMS NOT EXAMINED]    Constitutional: [x] Appears well-developed and well-nourished [x] No apparent distress        Mental status: [x] Alert and awake  [x] Oriented to person/place/time [x] Able to follow commands        Eyes:   EOM    [x]  Normal      Sclera  [x]  Normal              Discharge [x]  None visible        HENT: [x] Normocephalic, atraumatic       Pulmonary/Chest: [x] Respiratory effort normal   [x] No visualized signs of difficulty breathing or respiratory distress       Musculoskeletal:           [x] Normal range of motion of neck        Neurological:        [x] No Facial Asymmetry (Cranial nerve 7 motor function) (limited exam due to video visit)          [x] No gaze palsy                  Psychiatric:       [x] Normal Affect         [x] No Hallucinations    Diagnoses and all orders for this visit:    Non-recurrent acute serous otitis media of right ear  -     cefUROXime (CEFTIN) 500 mg tablet; Take 1 Tab by mouth two (2) times a day., Normal, Disp-20 Tab,R-0      PLAN:  Pt may continue the ear drops she has. I have discussed the diagnosis with the patient and the intended plan as seen in the above orders. The patient has received an after-visit summary and questions were answered concerning future plans. I have discussed medication side effects and warnings with the patient as well. Patient will call for further questions. Follow-up and Dispositions    · Return if symptoms worsen or fail to improve. We discussed the expected course, resolution and complications of the diagnosis(es) in detail. Medication risks, benefits, costs, interactions, and alternatives were discussed as indicated. I advised her to contact the office if her condition worsens, changes or fails to improve as anticipated. She expressed understanding with the diagnosis(es) and plan.        Geri Garcia, who was evaluated through a patient-initiated, synchronous (real-time) audio-video encounter, and/or her healthcare decision maker, is aware that it is a billable service, with coverage as determined by her insurance carrier. She provided verbal consent to proceed: Yes, and patient identification was verified. It was conducted pursuant to the emergency declaration under the [address] [place] [address], [address] [place] [address] authority and the [place] [facility] General Act. A caregiver was present when appropriate. Ability to conduct physical exam was limited. I was at home. The patient was at home.       Mickey Baeza, NP

## 2020-09-23 ENCOUNTER — VIRTUAL VISIT (OUTPATIENT)
Dept: FAMILY MEDICINE CLINIC | Age: 40
End: 2020-09-23
Payer: COMMERCIAL

## 2020-09-23 DIAGNOSIS — R42 VERTIGO: ICD-10-CM

## 2020-09-23 DIAGNOSIS — F43.22 ADJUSTMENT DISORDER WITH ANXIOUS MOOD: ICD-10-CM

## 2020-09-23 DIAGNOSIS — H65.01 NON-RECURRENT ACUTE SEROUS OTITIS MEDIA OF RIGHT EAR: Primary | ICD-10-CM

## 2020-09-23 PROCEDURE — 99214 OFFICE O/P EST MOD 30 MIN: CPT | Performed by: NURSE PRACTITIONER

## 2020-09-23 RX ORDER — MECLIZINE HYDROCHLORIDE 25 MG/1
25 TABLET ORAL
Qty: 30 TAB | Refills: 0 | Status: SHIPPED | OUTPATIENT
Start: 2020-09-23 | End: 2020-10-03

## 2020-09-23 RX ORDER — CEFUROXIME AXETIL 500 MG/1
500 TABLET ORAL 2 TIMES DAILY
Qty: 42 TAB | Refills: 0 | Status: SHIPPED | OUTPATIENT
Start: 2020-09-23 | End: 2020-10-14

## 2020-09-23 RX ORDER — ESCITALOPRAM OXALATE 20 MG/1
20 TABLET ORAL DAILY
Qty: 90 TAB | Refills: 3 | Status: SHIPPED | OUTPATIENT
Start: 2020-09-23 | End: 2021-10-19 | Stop reason: SDUPTHER

## 2020-09-23 NOTE — PROGRESS NOTES
Cheron Gilford is a 38 y.o. female who was seen by synchronous (real-time) audio-video technology on 9/23/2020 for vertigo    I am working from home. Patient is at her home. Subjective:     She got better but one week after she was finished with the antibiotic, the facial pressure returned, not the ear pain but now she has had vertigo for about 2-3 weeks. Pt would like a refill of her Lexapro. Prior to Admission medications    Medication Sig Start Date End Date Taking? Authorizing Provider   cefUROXime (CEFTIN) 500 mg tablet Take 1 Tab by mouth two (2) times a day. 7/22/20   Casey Frost NP   clotrimazole-betamethasone (LOTRISONE) topical cream Use small amount bid until clear to hip area. 5/28/20   Casey Frost NP   escitalopram oxalate (LEXAPRO) 20 mg tablet TAKE 1 TABLET BY MOUTH EVERY DAY 2/16/20   Casey Frost NP   albuterol (PROVENTIL HFA, VENTOLIN HFA, PROAIR HFA) 90 mcg/actuation inhaler Take 2 Puffs by inhalation every four (4) hours as needed for Wheezing or Shortness of Breath. 11/26/19   John Santiago MD   benzonatate (TESSALON) 100 mg capsule Take 1 Cap by mouth three (3) times daily as needed for Cough. 11/26/19   John Santiago MD   prednisoLONE acetate (PRED FORTE) 1 % ophthalmic suspension  11/5/19   Provider, Historical   NP THYROID 15 mg tablet 30 mg. 8/27/19   Provider, Historical   doxepin (SINEQUAN) 10 mg capsule TAKE 1 TO 2 CAPSULES BY MOUTH AT BEDTIME 4/22/19   Provider, Historical   glycopyrrolate (ROBINUL) 1 mg tablet Take 1 mg by mouth daily. Provider, Historical     Patient Active Problem List    Diagnosis Date Noted    Asthma     Recurrent depression ([facility] [address] 12/27/2017    Cough 12/04/2017    GERD (gastroesophageal reflux disease) 08/13/2013     Current Outpatient Medications   Medication Sig Dispense Refill    cefUROXime (CEFTIN) 500 mg tablet Take 1 Tab by mouth two (2) times a day for 21 days.  42 Tab 0    meclizine (ANTIVERT) 25 mg tablet Take 1 Tab by mouth three (3) times daily as needed for Dizziness for up to 10 days. 30 Tab 0    escitalopram oxalate (LEXAPRO) 20 mg tablet Take 1 Tab by mouth daily. 90 Tab 3    clotrimazole-betamethasone (LOTRISONE) topical cream Use small amount bid until clear to hip area. 15 g 1    albuterol (PROVENTIL HFA, VENTOLIN HFA, PROAIR HFA) 90 mcg/actuation inhaler Take 2 Puffs by inhalation every four (4) hours as needed for Wheezing or Shortness of Breath. 1 Inhaler 0    benzonatate (TESSALON) 100 mg capsule Take 1 Cap by mouth three (3) times daily as needed for Cough. 20 Cap 0    prednisoLONE acetate (PRED FORTE) 1 % ophthalmic suspension       NP THYROID 15 mg tablet 30 mg.  2    doxepin (SINEQUAN) 10 mg capsule TAKE 1 TO 2 CAPSULES BY MOUTH AT BEDTIME  0    glycopyrrolate (ROBINUL) 1 mg tablet Take 1 mg by mouth daily. No Known Allergies  Past Medical History:   Diagnosis Date    Asthma     Depression     Seasonal allergic rhinitis     Thyroid disease      Past Surgical History:   Procedure Laterality Date    HX  SECTION       Family History   Problem Relation Age of Onset    Cancer Paternal Grandfather     Hypertension Paternal Grandfather     Cancer Mother         thyr    Other Mother         hyperglycemicand fibromyalgi    Cancer Father     High Cholesterol Father     Hypertension Maternal Aunt     High Cholesterol Maternal Aunt     Hypertension Maternal Uncle     High Cholesterol Maternal Uncle     Diabetes Maternal Grandmother     Hypertension Maternal Grandmother     High Cholesterol Maternal Grandmother     Lung Disease Maternal Grandfather     Hypertension Maternal Grandfather      Social History     Tobacco Use    Smoking status: Never Smoker    Smokeless tobacco: Never Used   Substance Use Topics    Alcohol use: Yes     Comment: 1-2 weekly       Review of Systems   Constitutional: Negative. HENT: Positive for congestion and sinus pain. Respiratory: Negative. Cardiovascular: Negative. Neurological: Positive for dizziness. Objective:   No flowsheet data found. [INSTRUCTIONS:  \"[x]\" Indicates a positive item  \"[]\" Indicates a negative item  -- DELETE ALL ITEMS NOT EXAMINED]    Constitutional: [x] Appears well-developed and well-nourished [x] No apparent distress          Mental status: [x] Alert and awake  [x] Oriented to person/place/time [x] Able to follow commands        Eyes:   EOM    [x]  Normal       Sclera  [x]  Normal              Discharge [x]  None visible       HENT: [x] Normocephalic, atraumatic         Pulmonary/Chest: [x] Respiratory effort normal   [x] No visualized signs of difficulty breathing or respiratory distress             Musculoskeletal:           [x] Normal range of motion of neck          Neurological:        [x] No Facial Asymmetry (Cranial nerve 7 motor function) (limited exam due to video visit)          [x] No gaze palsy                  Psychiatric:       [x] Normal Affect        [x] No Hallucinations    Diagnoses and all orders for this visit:    Non-recurrent acute serous otitis media of right ear  -     cefUROXime (CEFTIN) 500 mg tablet; Take 1 Tab by mouth two (2) times a day for 21 days. , Normal, Disp-42 Tab,R-0    Vertigo  -     meclizine (ANTIVERT) 25 mg tablet; Take 1 Tab by mouth three (3) times daily as needed for Dizziness for up to 10 days. , Normal, Disp-30 Tab,R-0    Adjustment disorder with anxious mood  -     escitalopram oxalate (LEXAPRO) 20 mg tablet; Take 1 Tab by mouth daily. , Normal, Disp-90 Tab,R-3      PLAN:    I have discussed the diagnosis with the patient and the intended plan as seen in the above orders. The patient has received an after-visit summary and questions were answered concerning future plans. I have discussed medication side effects and warnings with the patient as well. Patient will call for further questions.     Follow-up and Dispositions    · Return if symptoms worsen or fail to improve. We discussed the expected course, resolution and complications of the diagnosis(es) in detail. Medication risks, benefits, costs, interactions, and alternatives were discussed as indicated. I advised her to contact the office if her condition worsens, changes or fails to improve as anticipated. She expressed understanding with the diagnosis(es) and plan. Kaylie Wharton, who was evaluated through a patient-initiated, synchronous (real-time) audio-video encounter, and/or her healthcare decision maker, is aware that it is a billable service, with coverage as determined by her insurance carrier. She provided verbal consent to proceed: Yes, and patient identification was verified. It was conducted pursuant to the emergency declaration under the [address] [place] [address] authority and the [employer] General Act. A caregiver was present when appropriate. Ability to conduct physical exam was limited. I was at home. The patient was at home.       Finesse Basilio NP

## 2020-10-05 DIAGNOSIS — R42 VERTIGO: ICD-10-CM

## 2020-10-05 DIAGNOSIS — H69.80 DYSFUNCTION OF EUSTACHIAN TUBE, UNSPECIFIED LATERALITY: Primary | ICD-10-CM

## 2020-12-27 NOTE — TELEPHONE ENCOUNTER
Called and confirmed with pharmacy that the refill 3/11/19 was not completed. Patient has an appointment scheduled 5/3/19, 14 day supply verbal with read back from ZAIRA Frost. wound culture

## 2021-01-14 ENCOUNTER — VIRTUAL VISIT (OUTPATIENT)
Dept: FAMILY MEDICINE CLINIC | Age: 41
End: 2021-01-14
Payer: COMMERCIAL

## 2021-01-14 DIAGNOSIS — F41.9 ANXIETY: Primary | ICD-10-CM

## 2021-01-14 PROCEDURE — 99213 OFFICE O/P EST LOW 20 MIN: CPT | Performed by: FAMILY MEDICINE

## 2021-01-14 RX ORDER — PROGESTERONE 100 MG/1
100 CAPSULE ORAL DAILY
COMMUNITY
End: 2021-10-14 | Stop reason: ALTCHOICE

## 2021-01-14 RX ORDER — ESTRADIOL 0.07 MG/D
FILM, EXTENDED RELEASE TRANSDERMAL
COMMUNITY
Start: 2020-11-05 | End: 2021-10-14 | Stop reason: ALTCHOICE

## 2021-01-14 RX ORDER — SULFAMETHOXAZOLE AND TRIMETHOPRIM 800; 160 MG/1; MG/1
TABLET ORAL
COMMUNITY
Start: 2020-11-20

## 2021-01-14 RX ORDER — BUSPIRONE HYDROCHLORIDE 15 MG/1
7.5 TABLET ORAL 2 TIMES DAILY
Qty: 30 TAB | Refills: 6 | Status: SHIPPED | OUTPATIENT
Start: 2021-01-14 | End: 2021-08-17 | Stop reason: SDUPTHER

## 2021-01-14 NOTE — PROGRESS NOTES
Jay Watkins is a 38 y.o. female who was seen by synchronous (real-time) audio-video technology on 1/14/2021 for Anxiety        Assessment & Plan:   Diagnoses and all orders for this visit:    1. Anxiety    Other orders  -     busPIRone (BUSPAR) 15 mg tablet; Take 0.5 Tabs by mouth two (2) times a day. As above, not controlled  Add buspar  Continue lexapro  Follow-up and Dispositions    · Return in about 3 months (around 4/14/2021) for anxiety. This has been fully explained to the patient, who indicates understanding. [facility] and pt has been advised of same. 712  Subjective:   Pt has had increased anxiety  On lexapro which usually controls her symptoms  Sx have been worsening for the last 2-3 weeks  She has had situational stressors  She has had crying spells. Prior to Admission medications    Medication Sig Start Date End Date Taking? Authorizing Provider   progesterone (PROMETRIUM) 100 mg capsule Take 100 mg by mouth daily. Yes Provider, Historical   PROGESTERONE by Does Not Apply route. Yes Provider, Historical   TESTOSTERONE BU by Buccal route. Yes Provider, Historical   busPIRone (BUSPAR) 15 mg tablet Take 0.5 Tabs by mouth two (2) times a day. 1/14/21  Yes Virginia Landeros MD   estradioL (VIVELLE) 0.075 mg/24 hr APPLY 1 PATCH 2 TIMES A WEEK AS DIRECTED 11/5/20  Yes Provider, Historical   [facility] Thyroid 60 mg tablet TAKE 1 TABLET BY MOUTH EVERY MORNING TO EQUAL 75 MG 11/20/20  Yes Provider, Historical   escitalopram oxalate (LEXAPRO) 20 mg tablet Take 1 Tab by mouth daily. 9/23/20  Yes Brittney Frost NP   prednisoLONE acetate (PRED FORTE) 1 % ophthalmic suspension  11/5/19  Yes Provider, Historical   albuterol (PROVENTIL HFA, VENTOLIN HFA, PROAIR HFA) 90 mcg/actuation inhaler Take 2 Puffs by inhalation every four (4) hours as needed for Wheezing or Shortness of Breath.  11/26/19   Virginia Landeros MD   benzonatate (TESSALON) 100 mg capsule Take 1 Cap by mouth three (3) times daily as needed for Cough. 19   Haven Menezes MD     Patient Active Problem List    Diagnosis Date Noted    Asthma     Recurrent depression ([address] 2017    Cough 2017    GERD (gastroesophageal reflux disease) 2013     No Known Allergies  Past Medical History:   Diagnosis Date    Asthma     Depression     Seasonal allergic rhinitis     Thyroid disease      Past Surgical History:   Procedure Laterality Date    HX  SECTION       Family History   Problem Relation Age of Onset    Cancer Paternal Grandfather     Hypertension Paternal Grandfather     Cancer Mother         thyr    Other Mother         hyperglycemicand fibromyalgi    Cancer Father     High Cholesterol Father     Hypertension Maternal Aunt     High Cholesterol Maternal Aunt     Hypertension Maternal Uncle     High Cholesterol Maternal Uncle     Diabetes Maternal Grandmother     Hypertension Maternal Grandmother     High Cholesterol Maternal Grandmother     Lung Disease Maternal Grandfather     Hypertension Maternal Grandfather      Social History     Tobacco Use    Smoking status: Never Smoker    Smokeless tobacco: Never Used   Substance Use Topics    Alcohol use: Yes     Comment: 1-2 weekly       Review of Systems   Cardiovascular: Negative for chest pain and palpitations. Psychiatric/Behavioral: Negative for depression and suicidal ideas. Objective:   No flowsheet data found.    General: alert, cooperative, no distress   Mental  status: normal mood, behavior, speech, dress, motor activity, and thought processes, able to follow commands   HENT: NCAT   Neck: no visualized mass   Resp: no respiratory distress   Neuro: no gross deficits   Skin: no discoloration or lesions of concern on visible areas   Psychiatric: normal affect, consistent with stated mood, no evidence of hallucinations     Additional exam findings: none      We discussed the expected course, resolution and complications of the diagnosis(es) in detail. Medication risks, benefits, costs, interactions, and alternatives were discussed as indicated. I advised her to contact the office if her condition worsens, changes or fails to improve as anticipated. She expressed understanding with the diagnosis(es) and plan. Prabha Castaneda, who was evaluated through a patient-initiated, synchronous (real-time) audio-video encounter, and/or her healthcare decision maker, is aware that it is a billable service, with coverage as determined by her insurance carrier. She provided verbal consent to proceed: Yes, and patient identification was verified. It was conducted pursuant to the emergency declaration under the [facility], [address] [place] [address] authority and the [employer] General Act. A caregiver was present when appropriate. Ability to conduct physical exam was limited. I was at home. The patient was at home.       Monica Stafford MD

## 2021-01-17 NOTE — PATIENT INSTRUCTIONS

## 2021-08-17 NOTE — TELEPHONE ENCOUNTER
Last Visit: 1/14/21 with MD Zane Lam  Next Appointment: Advised to follow-up in 3 months  Previous Refill Encounter(s): 1/14/21 #30 with 6 refills    Requested Prescriptions     Pending Prescriptions Disp Refills    busPIRone (BUSPAR) 15 mg tablet 90 Tablet 0     Sig: Take 0.5 Tablets by mouth two (2) times a day.

## 2021-08-19 RX ORDER — BUSPIRONE HYDROCHLORIDE 15 MG/1
7.5 TABLET ORAL 2 TIMES DAILY
Qty: 90 TABLET | Refills: 0 | Status: SHIPPED | OUTPATIENT
Start: 2021-08-19 | End: 2021-11-19

## 2021-10-14 ENCOUNTER — VIRTUAL VISIT (OUTPATIENT)
Dept: FAMILY MEDICINE CLINIC | Age: 41
End: 2021-10-14
Payer: COMMERCIAL

## 2021-10-14 DIAGNOSIS — F33.9 RECURRENT DEPRESSION (HCC): Primary | ICD-10-CM

## 2021-10-14 PROCEDURE — 99214 OFFICE O/P EST MOD 30 MIN: CPT | Performed by: FAMILY MEDICINE

## 2021-10-14 RX ORDER — BUPROPION HYDROCHLORIDE 150 MG/1
150 TABLET, EXTENDED RELEASE ORAL 2 TIMES DAILY
Qty: 60 TABLET | Refills: 4 | Status: SHIPPED | OUTPATIENT
Start: 2021-10-14

## 2021-10-14 NOTE — PROGRESS NOTES
Kelsey Kidd is a 42 y.o. female who was seen by synchronous (real-time) audio-video technology on 10/14/2021 for Medication Evaluation (lexapro )        Assessment & Plan:   Diagnoses and all orders for this visit:    1. Recurrent depression ([address]    Other orders  -     buPROPion SR (WELLBUTRIN SR) 150 mg SR tablet; Take 1 Tablet by mouth two (2) times a day. As above  Add Wellbutrin as ordered  Consider restarting the progesterone  and estrogen. Patient is to contact the prescriber. Follow-up and Dispositions    · Return in about 8 weeks (around 12/9/2021) for depression, anxiety. Routing History        This has been fully explained to the patient, who indicates understanding. [facility] and pt has been advised of same. 712  Subjective:   Patient states she has been having some difficulty with her depression. She has been on Lexapro and does not feel like Lexapro is helping her as well. She has anhedonia. She reports decreased focus. She states she is still working from home and this at times can affect her symptoms. She also has been prescribed estrogen and progesterone and has recently stopped taking these medications. She did not consider the specifically affecting her mood as well. She needs a refill. But she also needs to follow-up with  the prescriber. Diet    Prior to Admission medications    Medication Sig Start Date End Date Taking? Authorizing Provider   buPROPion SR [facility] SR) 150 mg SR tablet Take 1 Tablet by mouth two (2) times a day. 10/14/21  Yes Nino Chu MD   busPIRone (BUSPAR) 15 mg tablet Take 0.5 Tablets by mouth two (2) times a day. 8/19/21  Yes Nino Chu MD   TESTOSTERONE BU by Buccal route. Yes Provider, Historical   Keller Thyroid 60 mg tablet TAKE 1 TABLET BY MOUTH EVERY MORNING TO EQUAL 75 MG 11/20/20  Yes Provider, Historical   escitalopram oxalate (LEXAPRO) 20 mg tablet Take 1 Tab by mouth daily.  9/23/20  Yes Santosh Antonella Gamboa, ZAIRA   prednisoLONE acetate (PRED FORTE) 1 % ophthalmic suspension  19  Yes Provider, Historical     Patient Active Problem List    Diagnosis Date Noted    Asthma     Recurrent depression ([facility] 2017    Cough 2017    GERD (gastroesophageal reflux disease) 2013     Current Outpatient Medications   Medication Sig Dispense Refill    buPROPion SR (WELLBUTRIN SR) 150 mg SR tablet Take 1 Tablet by mouth two (2) times a day. 60 Tablet 4    busPIRone (BUSPAR) 15 mg tablet Take 0.5 Tablets by mouth two (2) times a day. 90 Tablet 0    TESTOSTERONE BU by Buccal route.  Idanha Thyroid 60 mg tablet TAKE 1 TABLET BY MOUTH EVERY MORNING TO EQUAL 75 MG      escitalopram oxalate (LEXAPRO) 20 mg tablet Take 1 Tab by mouth daily. 90 Tab 3    prednisoLONE acetate (PRED FORTE) 1 % ophthalmic suspension        No Known Allergies  Past Medical History:   Diagnosis Date    Asthma     Depression     Seasonal allergic rhinitis     Thyroid disease      Past Surgical History:   Procedure Laterality Date    HX  SECTION       Family History   Problem Relation Age of Onset    Cancer Paternal Grandfather     Hypertension Paternal Grandfather     Cancer Mother         thyr    Other Mother         hyperglycemicand fibromyalgi    Cancer Father     High Cholesterol Father     Hypertension Maternal Aunt     High Cholesterol Maternal Aunt     Hypertension Maternal Uncle     High Cholesterol Maternal Uncle     Diabetes Maternal Grandmother     Hypertension Maternal Grandmother     High Cholesterol Maternal Grandmother     Lung Disease Maternal Grandfather     Hypertension Maternal Grandfather      Social History     Tobacco Use    Smoking status: Never Smoker    Smokeless tobacco: Never Used   Substance Use Topics    Alcohol use: Yes     Comment: 1-2 weekly       Solomon per hpi    Objective:   No flowsheet data found.    General: alert, cooperative, no distress   Mental  status: normal mood, behavior, speech, dress, motor activity, and thought processes, able to follow commands   HENT: NCAT   Neck: no visualized mass   Resp: no respiratory distress   Neuro: no gross deficits   Skin: no discoloration or lesions of concern on visible areas   Psychiatric: normal affect, consistent with stated mood, no evidence of hallucinations     Additional exam findings: none      We discussed the expected course, resolution and complications of the diagnosis(es) in detail. Medication risks, benefits, costs, interactions, and alternatives were discussed as indicated. I advised her to contact the office if her condition worsens, changes or fails to improve as anticipated. She expressed understanding with the diagnosis(es) and plan. Contreras Moss, was evaluated through a synchronous (real-time) audio-video encounter. The patient (or guardian if applicable) is aware that this is a billable service. Verbal consent to proceed has been obtained within the past 12 months. The visit was conducted pursuant to the emergency declaration under the [facility] [address] authority and the [employer] and [employer] General Act. Patient identification was verified, and a caregiver was present when appropriate. The patient was located in a state where the provider was credentialed to provide care.     Abdiel Costa MD

## 2021-10-14 NOTE — PROGRESS NOTES
Sarahy De (: 1980) is a 42 y.o. female, established patient, here for evaluation of the following chief complaint(s):   Medication Evaluation (lexapro )       Sarahy De, was evaluated through a synchronous (real-time) audio-video encounter. The patient (or guardian if applicable) is aware that this is a billable service. Verbal consent to proceed has been obtained within the past 12 months. The visit was conducted pursuant to the emergency declaration under the [address] [place] [address], [address] [place] [address] authority and the [employer] and [employer] General Act. Patient identification was verified, and a caregiver was present when appropriate. The patient was located in a state where the provider was credentialed to provide care. An electronic signature was used to authenticate this note.   -- Yosvany Li

## 2021-10-17 NOTE — PATIENT INSTRUCTIONS
Learning About Depression Screening  What is depression screening? Depression screening is a way to see if you have depression symptoms. It may be done by a doctor or counselor. It's often part of a routine checkup. That's because your mental health is just as important as your physical health. Depression is a medical illness that affects how you feel, think, and act. You may:  · Have less energy. · Lose interest in your daily activities. · Feel sad and grouchy for a long time. Depression is very common. It affects men and women of all ages. Many things can trigger depression. Some people become depressed after they have a stroke or find out they have a major illness like cancer or heart disease. The death of a loved one, a breakup, or changes in the natural brain chemicals may lead to depression. It can run in families. Most experts believe that a combination of inherited genes and stressful life events can cause it. What happens during screening? You may be asked to fill out a form about your depression symptoms. You and the doctor will discuss your answers. The doctor may ask you more questions to learn more about how you think, act, and feel. What happens after screening? If you have signs of depression, your doctor will talk to you about your options. Doctors usually treat depression with medicines or counseling. Often, combining the two works best. Many people don't get help because they think that they'll get over the depression on their own. But people with depression may not get better unless they get treatment. Many people feel embarrassed or ashamed about having depression. But it isn't a sign of personal weakness. It's not a character flaw. A person who is depressed is not \"crazy. \" Depression is caused by changes in the brain. A serious symptom of depression is thinking about death or suicide.  If you or someone you care about talks about this or about feeling hopeless, get help right away.  It's important to know that depression can be treated. The first step toward feeling better is often just seeing that the problem exists. Where can you learn more? Go to http://www.gray.com/  Enter T185 in the search box to learn more about \"Learning About Depression Screening. \"  Current as of: June 16, 2021               Content Version: 13.0  © 1427-5459 [employer]. Care instructions adapted under license by [email or web address] (which disclaims liability or warranty for this information). If you have questions about a medical condition or this instruction, always ask your healthcare professional. [place] [address] any warranty or liability for your use of this information.

## 2021-10-19 DIAGNOSIS — F43.22 ADJUSTMENT DISORDER WITH ANXIOUS MOOD: ICD-10-CM

## 2021-10-19 NOTE — TELEPHONE ENCOUNTER
Last Visit: 10/14/21 with MD Giorgio Foster  Next Appointment: Advised to follow-up in 8 weeks  Previous Refill Encounter(s): 9/23/20 #90 with 3 refills    Requested Prescriptions     Pending Prescriptions Disp Refills    escitalopram oxalate (LEXAPRO) 20 mg tablet 90 Tablet 3     Sig: Take 1 Tablet by mouth daily.

## 2021-10-22 RX ORDER — ESCITALOPRAM OXALATE 20 MG/1
20 TABLET ORAL DAILY
Qty: 90 TABLET | Refills: 3 | Status: SHIPPED | OUTPATIENT
Start: 2021-10-22

## 2021-11-19 RX ORDER — BUSPIRONE HYDROCHLORIDE 15 MG/1
TABLET ORAL
Qty: 90 TABLET | Refills: 0 | Status: SHIPPED | OUTPATIENT
Start: 2021-11-19 | End: 2022-02-17

## 2022-02-17 RX ORDER — BUSPIRONE HYDROCHLORIDE 15 MG/1
TABLET ORAL
Qty: 90 TABLET | Refills: 0 | Status: SHIPPED | OUTPATIENT
Start: 2022-02-17

## 2022-03-18 PROBLEM — R05.9 COUGH: Status: ACTIVE | Noted: 2017-12-04

## 2022-03-19 PROBLEM — F33.9 RECURRENT DEPRESSION (HCC): Status: ACTIVE | Noted: 2017-12-27

## 2022-09-08 ENCOUNTER — HOSPITAL ENCOUNTER (EMERGENCY)
Age: 42
Discharge: HOME | End: 2022-09-08
Attending: EMERGENCY MEDICINE
Payer: COMMERCIAL

## 2022-09-08 VITALS
WEIGHT: 195 LBS | BODY MASS INDEX: 35.88 KG/M2 | HEIGHT: 62 IN | TEMPERATURE: 98.6 F | SYSTOLIC BLOOD PRESSURE: 120 MMHG | OXYGEN SATURATION: 98 % | HEART RATE: 81 BPM | DIASTOLIC BLOOD PRESSURE: 73 MMHG | RESPIRATION RATE: 16 BRPM

## 2022-09-08 DIAGNOSIS — S05.00XA CORNEAL ABRASION, UNSPECIFIED LATERALITY, INITIAL ENCOUNTER: Primary | ICD-10-CM

## 2022-09-08 PROCEDURE — 99283 EMERGENCY DEPT VISIT LOW MDM: CPT

## 2022-09-08 PROCEDURE — 74011000250 HC RX REV CODE- 250: Performed by: EMERGENCY MEDICINE

## 2022-09-08 RX ORDER — ERYTHROMYCIN 5 MG/G
OINTMENT OPHTHALMIC
Qty: 3.5 G | Refills: 0 | Status: SHIPPED | OUTPATIENT
Start: 2022-09-08

## 2022-09-08 RX ORDER — PROPARACAINE HYDROCHLORIDE 5 MG/ML
1 SOLUTION/ DROPS OPHTHALMIC
Status: COMPLETED | OUTPATIENT
Start: 2022-09-08 | End: 2022-09-08

## 2022-09-08 RX ORDER — ACETAMINOPHEN AND CODEINE PHOSPHATE 300; 30 MG/1; MG/1
1 TABLET ORAL
Qty: 12 TABLET | Refills: 0 | Status: SHIPPED | OUTPATIENT
Start: 2022-09-08 | End: 2022-09-13

## 2022-09-08 RX ADMIN — FLUORESCEIN SODIUM 1 STRIP: 0.6 STRIP OPHTHALMIC at 20:18

## 2022-09-08 RX ADMIN — PROPARACAINE HYDROCHLORIDE 1 DROP: 5 SOLUTION/ DROPS OPHTHALMIC at 20:18

## 2022-09-09 NOTE — ED PROVIDER NOTES
Eye Pain   Associated symptoms include negative and pain. Presents to the ER with complaint having left pain. States she accidentally got poked in her left eye with an envelope tonight. Past Medical History:   Diagnosis Date    Asthma     Depression     Seasonal allergic rhinitis     Thyroid disease        Past Surgical History:   Procedure Laterality Date    HX  SECTION           Family History:   Problem Relation Age of Onset    Cancer Paternal Grandfather     Hypertension Paternal Grandfather     Cancer Mother         thyr    Other Mother         hyperglycemicand fibromyalgi    Cancer Father     High Cholesterol Father     Hypertension Maternal Aunt     High Cholesterol Maternal Aunt     Hypertension Maternal Uncle     High Cholesterol Maternal Uncle     Diabetes Maternal Grandmother     Hypertension Maternal Grandmother     High Cholesterol Maternal Grandmother     Lung Disease Maternal Grandfather     Hypertension Maternal Grandfather        Social History     Socioeconomic History    Marital status:      Spouse name: Not on file    Number of children: Not on file    Years of education: Not on file    Highest education level: Not on file   Occupational History    Not on file   Tobacco Use    Smoking status: Never    Smokeless tobacco: Never   Vaping Use    Vaping Use: Never used   Substance and Sexual Activity    Alcohol use: Yes     Comment: 1-2 weekly    Drug use: No    Sexual activity: Yes     Partners: Male   Other Topics Concern    Not on file   Social History Narrative    Not on file     Social Determinants of Health     Financial Resource Strain: Not on file   Food Insecurity: Not on file   Transportation Needs: Not on file   Physical Activity: Not on file   Stress: Not on file   Social Connections: Not on file   Intimate Partner Violence: Not on file   Housing Stability: Not on file         ALLERGIES: Patient has no known allergies.     Review of Systems   Constitutional: Negative. HENT: Negative. Eyes:  Positive for pain. Respiratory: Negative. Cardiovascular: Negative. Gastrointestinal: Negative. Endocrine: Negative. Genitourinary: Negative. Musculoskeletal: Negative. Skin: Negative. Allergic/Immunologic: Negative. Neurological: Negative. Hematological: Negative. Psychiatric/Behavioral: Negative. All other systems reviewed and are negative. Vitals:    09/08/22 1922   BP: 120/73   Pulse: 81   Resp: 16   Temp: 98.6 °F (37 °C)   SpO2: 98%   Weight: 88.5 kg (195 lb)   Height: 5' 2\" (1.575 m)            Physical Exam  Vitals and nursing note reviewed. Constitutional:       General: She is not in acute distress. Appearance: She is well-developed. She is not diaphoretic. HENT:      Head: Normocephalic. Right Ear: External ear normal.      Left Ear: External ear normal.      Mouth/Throat:      Pharynx: No oropharyngeal exudate. Eyes:      General: No scleral icterus. Right eye: No discharge. Left eye: No discharge. Conjunctiva/sclera: Conjunctivae normal.      Pupils: Pupils are equal, round, and reactive to light. Comments: Patient examination: Shows fluorescein uptake in 4 o'clock position in her left eye. Neck:      Thyroid: No thyromegaly. Vascular: No JVD. Trachea: No tracheal deviation. Cardiovascular:      Rate and Rhythm: Normal rate and regular rhythm. Heart sounds: Normal heart sounds. No murmur heard. No friction rub. No gallop. Pulmonary:      Effort: Pulmonary effort is normal. No respiratory distress. Breath sounds: Normal breath sounds. No stridor. No wheezing or rales. Chest:      Chest wall: No tenderness. Abdominal:      General: Bowel sounds are normal. There is no distension. Palpations: Abdomen is soft. There is no mass. Tenderness: There is no abdominal tenderness. There is no guarding or rebound.    Musculoskeletal:         General: No tenderness. Normal range of motion. Cervical back: Normal range of motion and neck supple. Lymphadenopathy:      Cervical: No cervical adenopathy. Skin:     General: Skin is warm and dry. Coloration: Skin is not pale. Findings: No erythema or rash. Neurological:      Mental Status: She is alert and oriented to person, place, and time. Cranial Nerves: No cranial nerve deficit. Motor: No abnormal muscle tone. Coordination: Coordination normal.      Deep Tendon Reflexes: Reflexes normal.        MDM         Procedures    Dx: corneal abrasion    Disp: D/C home. Ophthalmologist in today for further evaluation. Rx: Erythromycin abdominal ointment applied to affected 4 times daily x7 days.   Rx: Erythromycin abdominal ointment, Tylenol 3    Return to ER as needed

## 2022-09-09 NOTE — ED NOTES
9:06 PM  09/08/22     Discharge instructions given to [facility] (name) with verbalization of understanding. Patient accompanied by spouse. Patient discharged with the following prescriptions Tylenol-Codeine, Ilotycin. Patient discharged to home (destination).       Perri Holm RN

## 2024-07-01 ENCOUNTER — HOSPITAL ENCOUNTER (OUTPATIENT)
Facility: HOSPITAL | Age: 44
Discharge: HOME | End: 2024-07-04
Payer: COMMERCIAL

## 2024-07-01 VITALS — BODY MASS INDEX: 35.3 KG/M2 | WEIGHT: 193 LBS

## 2024-07-01 DIAGNOSIS — Z12.31 BREAST CANCER SCREENING BY MAMMOGRAM: ICD-10-CM

## 2024-07-01 PROCEDURE — 76642 ULTRASOUND BREAST LIMITED: CPT

## 2024-07-01 PROCEDURE — G0279 TOMOSYNTHESIS, MAMMO: HCPCS

## 2024-07-18 ENCOUNTER — HOSPITAL ENCOUNTER (OUTPATIENT)
Dept: WOMENS IMAGING | Facility: HOSPITAL | Age: 44
End: 2024-07-18
Payer: COMMERCIAL

## 2024-07-18 DIAGNOSIS — N63.20 MASS OF LEFT BREAST, UNSPECIFIED QUADRANT: ICD-10-CM

## 2024-07-18 DIAGNOSIS — R92.8 ABNORMAL MAMMOGRAM: ICD-10-CM

## 2024-07-18 PROCEDURE — 77065 DX MAMMO INCL CAD UNI: CPT

## 2024-07-18 PROCEDURE — 19081 BX BREAST 1ST LESION STRTCTC: CPT

## 2024-07-18 PROCEDURE — 2500000003 HC RX 250 WO HCPCS: Performed by: RADIOLOGY

## 2024-07-18 PROCEDURE — 88305 TISSUE EXAM BY PATHOLOGIST: CPT

## 2024-07-18 RX ORDER — LIDOCAINE HYDROCHLORIDE 10 MG/ML
20 INJECTION, SOLUTION INFILTRATION; PERINEURAL ONCE
Status: COMPLETED | OUTPATIENT
Start: 2024-07-18 | End: 2024-07-18

## 2024-07-18 RX ORDER — LIDOCAINE HYDROCHLORIDE AND EPINEPHRINE 10; 10 MG/ML; UG/ML
20 INJECTION, SOLUTION INFILTRATION; PERINEURAL ONCE
Status: COMPLETED | OUTPATIENT
Start: 2024-07-18 | End: 2024-07-18

## 2024-07-18 RX ADMIN — LIDOCAINE HYDROCHLORIDE 3 ML: 10 INJECTION, SOLUTION INFILTRATION; PERINEURAL at 10:14

## 2024-07-18 RX ADMIN — LIDOCAINE HYDROCHLORIDE,EPINEPHRINE BITARTRATE 20 ML: 10; .01 INJECTION, SOLUTION INFILTRATION; PERINEURAL at 10:15

## 2025-06-05 ENCOUNTER — TRANSCRIBE ORDERS (OUTPATIENT)
Facility: HOSPITAL | Age: 45
End: 2025-06-05

## 2025-06-05 DIAGNOSIS — Z12.31 ENCOUNTER FOR SCREENING MAMMOGRAM FOR MALIGNANT NEOPLASM OF BREAST: Primary | ICD-10-CM
